# Patient Record
Sex: FEMALE | Race: WHITE | URBAN - NONMETROPOLITAN AREA
[De-identification: names, ages, dates, MRNs, and addresses within clinical notes are randomized per-mention and may not be internally consistent; named-entity substitution may affect disease eponyms.]

---

## 2017-04-07 ENCOUNTER — DOCTOR'S OFFICE (OUTPATIENT)
Dept: URBAN - NONMETROPOLITAN AREA CLINIC 1 | Facility: CLINIC | Age: 37
Setting detail: OPHTHALMOLOGY
End: 2017-04-07
Payer: COMMERCIAL

## 2017-04-07 DIAGNOSIS — H04.121: ICD-10-CM

## 2017-04-07 DIAGNOSIS — H04.122: ICD-10-CM

## 2017-04-07 DIAGNOSIS — H25.13: ICD-10-CM

## 2017-04-07 DIAGNOSIS — H35.033: ICD-10-CM

## 2017-04-07 DIAGNOSIS — G37.9: ICD-10-CM

## 2017-04-07 PROCEDURE — 92014 COMPRE OPH EXAM EST PT 1/>: CPT | Performed by: OPHTHALMOLOGY

## 2017-04-07 PROCEDURE — 92083 EXTENDED VISUAL FIELD XM: CPT | Performed by: OPHTHALMOLOGY

## 2017-04-07 ASSESSMENT — REFRACTION_MANIFEST
OD_VA3: 20/
OS_SPHERE: +0.50
OS_VA1: 20/20-2
OD_SPHERE: +0.50
OS_CYLINDER: -0.75
OS_VA2: 20/20-2
OD_AXIS: 155
OS_VA2: 20/
OS_VA3: 20/
OS_VA3: 20/
OS_VA2: 20/
OD_VA3: 20/
OD_VA1: 20/
OD_VA1: 20/20-2
OU_VA: 20/
OU_VA: 20/
OD_VA1: 20/
OS_VA3: 20/
OU_VA: 20/
OS_VA1: 20/
OD_VA3: 20/
OD_CYLINDER: -0.75
OD_VA2: 20/
OS_VA1: 20/
OD_VA2: 20/20-2
OS_AXIS: 005
OD_VA2: 20/

## 2017-04-07 ASSESSMENT — SUPERFICIAL PUNCTATE KERATITIS (SPK): OS_SPK: T

## 2017-04-07 ASSESSMENT — REFRACTION_CURRENTRX
OD_OVR_VA: 20/
OD_OVR_VA: 20/
OD_AXIS: 168
OS_OVR_VA: 20/
OD_SPHERE: +0.75
OS_OVR_VA: 20/
OS_SPHERE: +0.75
OS_OVR_VA: 20/
OD_OVR_VA: 20/
OS_CYLINDER: -0.50
OD_CYLINDER: -0.75
OS_AXIS: 004

## 2017-04-07 ASSESSMENT — REFRACTION_AUTOREFRACTION
OD_SPHERE: +0.50
OS_SPHERE: +0.50
OS_AXIS: 172
OD_CYLINDER: -0.50
OS_CYLINDER: -0.50
OD_AXIS: 139

## 2017-04-07 ASSESSMENT — SPHEQUIV_DERIVED
OD_SPHEQUIV: 0.125
OS_SPHEQUIV: 0.125
OS_SPHEQUIV: 0.25
OD_SPHEQUIV: 0.25

## 2017-04-07 ASSESSMENT — VISUAL ACUITY
OD_BCVA: 20/20-2
OS_BCVA: 20/20-2

## 2017-04-07 ASSESSMENT — CONFRONTATIONAL VISUAL FIELD TEST (CVF)
OS_FINDINGS: FULL
OD_FINDINGS: FULL

## 2017-04-28 ENCOUNTER — LAB REQUISITION (OUTPATIENT)
Dept: LAB | Facility: HOSPITAL | Age: 37
End: 2017-04-28
Payer: COMMERCIAL

## 2017-04-28 DIAGNOSIS — G35 MULTIPLE SCLEROSIS (HCC): ICD-10-CM

## 2017-04-28 DIAGNOSIS — Z79.52 LONG TERM CURRENT USE OF SYSTEMIC STEROIDS: ICD-10-CM

## 2017-04-28 LAB
ANION GAP SERPL CALCULATED.3IONS-SCNC: 15 MMOL/L (ref 4–13)
BASOPHILS # BLD AUTO: 0.02 THOUSANDS/ΜL (ref 0–0.1)
BASOPHILS NFR BLD AUTO: 0 % (ref 0–1)
BUN SERPL-MCNC: 13 MG/DL (ref 5–25)
CALCIUM SERPL-MCNC: 9.2 MG/DL (ref 8.3–10.1)
CHLORIDE SERPL-SCNC: 105 MMOL/L (ref 100–108)
CO2 SERPL-SCNC: 21 MMOL/L (ref 21–32)
CREAT SERPL-MCNC: 0.74 MG/DL (ref 0.6–1.3)
EOSINOPHIL # BLD AUTO: 0 THOUSAND/ΜL (ref 0–0.61)
EOSINOPHIL NFR BLD AUTO: 0 % (ref 0–6)
ERYTHROCYTE [DISTWIDTH] IN BLOOD BY AUTOMATED COUNT: 14.1 % (ref 11.6–15.1)
GFR SERPL CREATININE-BSD FRML MDRD: >60 ML/MIN/1.73SQ M
GLUCOSE SERPL-MCNC: 142 MG/DL (ref 65–140)
HCT VFR BLD AUTO: 40.7 % (ref 34.8–46.1)
HGB BLD-MCNC: 13.9 G/DL (ref 11.5–15.4)
LYMPHOCYTES # BLD AUTO: 2.05 THOUSANDS/ΜL (ref 0.6–4.47)
LYMPHOCYTES NFR BLD AUTO: 14 % (ref 14–44)
MCH RBC QN AUTO: 30 PG (ref 26.8–34.3)
MCHC RBC AUTO-ENTMCNC: 34.2 G/DL (ref 31.4–37.4)
MCV RBC AUTO: 88 FL (ref 82–98)
MONOCYTES # BLD AUTO: 0.27 THOUSAND/ΜL (ref 0.17–1.22)
MONOCYTES NFR BLD AUTO: 2 % (ref 4–12)
NEUTROPHILS # BLD AUTO: 12.69 THOUSANDS/ΜL (ref 1.85–7.62)
NEUTS SEG NFR BLD AUTO: 84 % (ref 43–75)
PLATELET # BLD AUTO: 182 THOUSANDS/UL (ref 149–390)
PMV BLD AUTO: 12.2 FL (ref 8.9–12.7)
POTASSIUM SERPL-SCNC: 4.3 MMOL/L (ref 3.5–5.3)
RBC # BLD AUTO: 4.63 MILLION/UL (ref 3.81–5.12)
SODIUM SERPL-SCNC: 141 MMOL/L (ref 136–145)
WBC # BLD AUTO: 15.03 THOUSAND/UL (ref 4.31–10.16)

## 2017-04-28 PROCEDURE — 80048 BASIC METABOLIC PNL TOTAL CA: CPT | Performed by: INTERNAL MEDICINE

## 2017-04-28 PROCEDURE — 85025 COMPLETE CBC W/AUTO DIFF WBC: CPT | Performed by: INTERNAL MEDICINE

## 2017-05-15 ENCOUNTER — DOCTOR'S OFFICE (OUTPATIENT)
Dept: URBAN - NONMETROPOLITAN AREA CLINIC 1 | Facility: CLINIC | Age: 37
Setting detail: OPHTHALMOLOGY
End: 2017-05-15
Payer: COMMERCIAL

## 2017-05-15 DIAGNOSIS — H04.122: ICD-10-CM

## 2017-05-15 DIAGNOSIS — H35.033: ICD-10-CM

## 2017-05-15 DIAGNOSIS — H53.122: ICD-10-CM

## 2017-05-15 DIAGNOSIS — G37.9: ICD-10-CM

## 2017-05-15 DIAGNOSIS — H25.13: ICD-10-CM

## 2017-05-15 DIAGNOSIS — H04.121: ICD-10-CM

## 2017-05-15 PROCEDURE — 92014 COMPRE OPH EXAM EST PT 1/>: CPT | Performed by: OPHTHALMOLOGY

## 2017-05-15 PROCEDURE — 92134 CPTRZ OPH DX IMG PST SGM RTA: CPT | Performed by: OPHTHALMOLOGY

## 2017-05-15 ASSESSMENT — REFRACTION_MANIFEST
OS_VA1: 20/20-2
OS_AXIS: 005
OS_VA2: 20/20-2
OD_VA3: 20/
OS_VA3: 20/
OS_CYLINDER: -0.75
OD_VA1: 20/20-2
OS_VA3: 20/
OD_SPHERE: +0.50
OS_VA3: 20/
OD_VA1: 20/
OU_VA: 20/
OS_VA2: 20/
OD_CYLINDER: -0.75
OS_VA2: 20/
OD_VA1: 20/
OS_SPHERE: +0.50
OS_VA1: 20/
OD_VA2: 20/20-2
OD_VA3: 20/
OD_VA2: 20/
OD_VA2: 20/
OU_VA: 20/
OS_VA1: 20/
OD_VA3: 20/
OD_AXIS: 155
OU_VA: 20/

## 2017-05-15 ASSESSMENT — SUPERFICIAL PUNCTATE KERATITIS (SPK): OS_SPK: T

## 2017-05-15 ASSESSMENT — REFRACTION_CURRENTRX
OD_OVR_VA: 20/
OD_SPHERE: +0.75
OD_AXIS: 168
OS_OVR_VA: 20/
OS_AXIS: 004
OS_SPHERE: +0.75
OS_CYLINDER: -0.50
OS_OVR_VA: 20/
OD_CYLINDER: -0.75
OD_OVR_VA: 20/
OS_OVR_VA: 20/
OD_OVR_VA: 20/

## 2017-05-15 ASSESSMENT — REFRACTION_AUTOREFRACTION
OS_AXIS: 172
OS_SPHERE: +0.50
OD_SPHERE: +0.50
OS_CYLINDER: -0.50
OD_AXIS: 139
OD_CYLINDER: -0.50

## 2017-05-15 ASSESSMENT — VISUAL ACUITY
OD_BCVA: 20/20
OS_BCVA: 20/20-2

## 2017-05-15 ASSESSMENT — SPHEQUIV_DERIVED
OS_SPHEQUIV: 0.125
OS_SPHEQUIV: 0.25
OD_SPHEQUIV: 0.125
OD_SPHEQUIV: 0.25

## 2017-05-15 ASSESSMENT — CONFRONTATIONAL VISUAL FIELD TEST (CVF)
OS_FINDINGS: FULL
OD_FINDINGS: FULL

## 2018-01-10 ENCOUNTER — DOCTOR'S OFFICE (OUTPATIENT)
Dept: URBAN - NONMETROPOLITAN AREA CLINIC 1 | Facility: CLINIC | Age: 38
Setting detail: OPHTHALMOLOGY
End: 2018-01-10
Payer: COMMERCIAL

## 2018-01-10 DIAGNOSIS — G37.9: ICD-10-CM

## 2018-01-10 DIAGNOSIS — H04.122: ICD-10-CM

## 2018-01-10 DIAGNOSIS — H04.121: ICD-10-CM

## 2018-01-10 DIAGNOSIS — H53.122: ICD-10-CM

## 2018-01-10 DIAGNOSIS — H46.8: ICD-10-CM

## 2018-01-10 DIAGNOSIS — H35.033: ICD-10-CM

## 2018-01-10 PROCEDURE — 92083 EXTENDED VISUAL FIELD XM: CPT | Performed by: OPHTHALMOLOGY

## 2018-01-10 PROCEDURE — 99214 OFFICE O/P EST MOD 30 MIN: CPT | Performed by: OPHTHALMOLOGY

## 2018-01-10 ASSESSMENT — CONFRONTATIONAL VISUAL FIELD TEST (CVF)
OS_FINDINGS: FULL
OD_FINDINGS: FULL

## 2018-01-10 ASSESSMENT — SUPERFICIAL PUNCTATE KERATITIS (SPK): OS_SPK: T

## 2018-01-11 ASSESSMENT — VISUAL ACUITY
OS_BCVA: 20/25-1
OD_BCVA: 20/30+1

## 2018-01-11 ASSESSMENT — REFRACTION_MANIFEST
OS_CYLINDER: -0.75
OS_VA3: 20/
OS_VA1: 20/20-2
OD_VA3: 20/
OD_VA2: 20/20-2
OU_VA: 20/
OD_VA2: 20/
OU_VA: 20/
OD_AXIS: 155
OD_VA1: 20/20-2
OD_VA2: 20/
OD_VA3: 20/
OU_VA: 20/
OD_VA1: 20/
OS_VA1: 20/
OD_SPHERE: +0.50
OS_VA3: 20/
OS_VA2: 20/
OD_CYLINDER: -0.75
OD_VA3: 20/
OS_VA2: 20/
OS_VA1: 20/
OD_VA1: 20/
OS_VA2: 20/20-2
OS_SPHERE: +0.50
OS_VA3: 20/
OS_AXIS: 005

## 2018-01-11 ASSESSMENT — REFRACTION_AUTOREFRACTION
OD_AXIS: 139
OD_CYLINDER: -0.50
OS_SPHERE: +0.50
OS_AXIS: 172
OD_SPHERE: +0.50
OS_CYLINDER: -0.50

## 2018-01-11 ASSESSMENT — REFRACTION_CURRENTRX
OD_SPHERE: +0.75
OD_OVR_VA: 20/
OD_OVR_VA: 20/
OD_AXIS: 168
OD_CYLINDER: -0.75
OD_OVR_VA: 20/
OS_OVR_VA: 20/
OS_OVR_VA: 20/
OS_CYLINDER: -0.50
OS_OVR_VA: 20/
OS_AXIS: 004
OS_SPHERE: +0.75

## 2018-01-11 ASSESSMENT — SPHEQUIV_DERIVED
OS_SPHEQUIV: 0.125
OD_SPHEQUIV: 0.25
OS_SPHEQUIV: 0.25
OD_SPHEQUIV: 0.125

## 2018-01-18 ENCOUNTER — DOCTOR'S OFFICE (OUTPATIENT)
Dept: URBAN - NONMETROPOLITAN AREA CLINIC 1 | Facility: CLINIC | Age: 38
Setting detail: OPHTHALMOLOGY
End: 2018-01-18
Payer: COMMERCIAL

## 2018-01-18 DIAGNOSIS — H46.8: ICD-10-CM

## 2018-01-18 DIAGNOSIS — G37.9: ICD-10-CM

## 2018-01-18 PROCEDURE — 99213 OFFICE O/P EST LOW 20 MIN: CPT | Performed by: OPHTHALMOLOGY

## 2018-01-18 PROCEDURE — 92083 EXTENDED VISUAL FIELD XM: CPT | Performed by: OPHTHALMOLOGY

## 2018-01-18 ASSESSMENT — REFRACTION_MANIFEST
OD_AXIS: 155
OD_VA2: 20/20-2
OS_VA1: 20/
OU_VA: 20/
OD_VA2: 20/
OS_VA3: 20/
OS_VA1: 20/20-2
OD_VA1: 20/
OS_VA2: 20/
OD_VA1: 20/20-2
OS_AXIS: 005
OD_VA3: 20/
OS_VA3: 20/
OS_SPHERE: +0.50
OS_VA3: 20/
OS_CYLINDER: -0.75
OD_VA3: 20/
OD_VA3: 20/
OS_VA2: 20/
OD_CYLINDER: -0.75
OS_VA1: 20/
OD_VA1: 20/
OD_SPHERE: +0.50
OD_VA2: 20/
OU_VA: 20/
OU_VA: 20/
OS_VA2: 20/20-2

## 2018-01-18 ASSESSMENT — REFRACTION_CURRENTRX
OS_AXIS: 004
OD_SPHERE: +0.75
OD_CYLINDER: -0.75
OD_OVR_VA: 20/
OS_OVR_VA: 20/
OS_CYLINDER: -0.50
OD_OVR_VA: 20/
OD_OVR_VA: 20/
OS_SPHERE: +0.75
OS_OVR_VA: 20/
OS_OVR_VA: 20/
OD_AXIS: 168

## 2018-01-18 ASSESSMENT — REFRACTION_AUTOREFRACTION
OD_SPHERE: +0.50
OS_AXIS: 172
OS_SPHERE: +0.50
OD_AXIS: 139
OD_CYLINDER: -0.50
OS_CYLINDER: -0.50

## 2018-01-18 ASSESSMENT — SPHEQUIV_DERIVED
OS_SPHEQUIV: 0.125
OD_SPHEQUIV: 0.25
OD_SPHEQUIV: 0.125
OS_SPHEQUIV: 0.25

## 2018-01-18 ASSESSMENT — VISUAL ACUITY
OS_BCVA: 20/25+2
OD_BCVA: 20/25-2

## 2018-01-18 ASSESSMENT — SUPERFICIAL PUNCTATE KERATITIS (SPK): OS_SPK: T

## 2018-08-28 ENCOUNTER — DOCTOR'S OFFICE (OUTPATIENT)
Dept: URBAN - METROPOLITAN AREA CLINIC 125 | Facility: CLINIC | Age: 38
Setting detail: OPHTHALMOLOGY
End: 2018-08-28

## 2018-08-28 DIAGNOSIS — H25.013: ICD-10-CM

## 2018-08-28 PROCEDURE — DISABILITY BUREAU OF DISABILITY REPORT: Performed by: OPHTHALMOLOGY

## 2018-10-11 ENCOUNTER — DOCTOR'S OFFICE (OUTPATIENT)
Dept: URBAN - NONMETROPOLITAN AREA CLINIC 1 | Facility: CLINIC | Age: 38
Setting detail: OPHTHALMOLOGY
End: 2018-10-11
Payer: COMMERCIAL

## 2018-10-11 DIAGNOSIS — H46.8: ICD-10-CM

## 2018-10-11 DIAGNOSIS — H25.13: ICD-10-CM

## 2018-10-11 DIAGNOSIS — H35.033: ICD-10-CM

## 2018-10-11 PROCEDURE — 99214 OFFICE O/P EST MOD 30 MIN: CPT | Performed by: OPHTHALMOLOGY

## 2018-10-11 ASSESSMENT — REFRACTION_MANIFEST
OD_AXIS: 155
OD_VA3: 20/
OS_CYLINDER: -0.75
OS_VA2: 20/20-2
OS_VA3: 20/
OD_VA3: 20/
OU_VA: 20/
OD_CYLINDER: -0.75
OD_VA2: 20/20-2
OU_VA: 20/
OD_VA2: 20/
OS_VA1: 20/20-2
OS_VA3: 20/
OD_VA1: 20/20-2
OS_VA2: 20/
OS_SPHERE: +0.50
OD_SPHERE: +0.50
OS_AXIS: 005
OD_VA1: 20/
OS_VA1: 20/

## 2018-10-11 ASSESSMENT — SPHEQUIV_DERIVED
OS_SPHEQUIV: 0.25
OS_SPHEQUIV: 0.125
OD_SPHEQUIV: 0.125
OD_SPHEQUIV: 0.25

## 2018-10-11 ASSESSMENT — VISUAL ACUITY
OD_BCVA: 20/25-2
OS_BCVA: 20/30+2

## 2018-10-11 ASSESSMENT — REFRACTION_CURRENTRX
OS_OVR_VA: 20/
OD_OVR_VA: 20/
OD_OVR_VA: 20/
OD_AXIS: 168
OS_CYLINDER: -0.50
OS_OVR_VA: 20/
OS_AXIS: 004
OD_CYLINDER: -0.75
OD_OVR_VA: 20/
OS_SPHERE: +0.75
OS_OVR_VA: 20/
OD_SPHERE: +0.75

## 2018-10-11 ASSESSMENT — REFRACTION_AUTOREFRACTION
OS_AXIS: 172
OS_SPHERE: +0.50
OD_AXIS: 139
OD_SPHERE: +0.50
OS_CYLINDER: -0.50
OD_CYLINDER: -0.50

## 2018-10-11 ASSESSMENT — SUPERFICIAL PUNCTATE KERATITIS (SPK): OS_SPK: T

## 2019-02-13 LAB
C TRACH RRNA SPEC QL NAA+PROBE: NOT DETECTED
CLINICAL INFO: NORMAL
CYTO CVX: NORMAL
CYTOLOGY CMNT CVX/VAG CYTO-IMP: NORMAL
DATE PREVIOUS BX: NORMAL
LMP START DATE: 2008
N GONORRHOEA RRNA SPEC QL NAA+PROBE: NOT DETECTED
SL AMB PREV. PAP:: NORMAL
SPECIMEN SOURCE CVX/VAG CYTO: NORMAL
STAT OF ADQ CVX/VAG CYTO-IMP: NORMAL

## 2019-12-21 ENCOUNTER — APPOINTMENT (EMERGENCY)
Dept: ULTRASOUND IMAGING | Facility: HOSPITAL | Age: 39
End: 2019-12-21
Payer: COMMERCIAL

## 2019-12-21 ENCOUNTER — APPOINTMENT (EMERGENCY)
Dept: CT IMAGING | Facility: HOSPITAL | Age: 39
End: 2019-12-21
Payer: COMMERCIAL

## 2019-12-21 ENCOUNTER — HOSPITAL ENCOUNTER (EMERGENCY)
Facility: HOSPITAL | Age: 39
Discharge: HOME | End: 2019-12-21
Admitting: EMERGENCY MEDICINE
Payer: COMMERCIAL

## 2019-12-21 VITALS
TEMPERATURE: 97.8 F | OXYGEN SATURATION: 100 % | WEIGHT: 150 LBS | SYSTOLIC BLOOD PRESSURE: 122 MMHG | RESPIRATION RATE: 18 BRPM | BODY MASS INDEX: 23.54 KG/M2 | HEIGHT: 67 IN | DIASTOLIC BLOOD PRESSURE: 75 MMHG | HEART RATE: 71 BPM

## 2019-12-21 DIAGNOSIS — N30.00 ACUTE CYSTITIS WITHOUT HEMATURIA: Primary | ICD-10-CM

## 2019-12-21 DIAGNOSIS — K52.9 GASTROENTERITIS: ICD-10-CM

## 2019-12-21 LAB
ALBUMIN SERPL BCP-MCNC: 4 G/DL (ref 3.5–5)
ALP SERPL-CCNC: 61 U/L (ref 46–116)
ALT SERPL W P-5'-P-CCNC: 12 U/L (ref 12–78)
ANION GAP SERPL CALCULATED.3IONS-SCNC: 6 MMOL/L (ref 4–13)
AST SERPL W P-5'-P-CCNC: 7 U/L (ref 5–45)
BACTERIA UR QL AUTO: ABNORMAL /HPF
BASOPHILS # BLD AUTO: 0.04 THOUSANDS/ΜL (ref 0–0.1)
BASOPHILS NFR BLD AUTO: 0 % (ref 0–1)
BILIRUB SERPL-MCNC: 0.45 MG/DL (ref 0.2–1)
BILIRUB UR QL STRIP: ABNORMAL
BUN SERPL-MCNC: 13 MG/DL (ref 5–25)
CALCIUM SERPL-MCNC: 9.4 MG/DL (ref 8.3–10.1)
CHLORIDE SERPL-SCNC: 100 MMOL/L (ref 100–108)
CLARITY UR: ABNORMAL
CO2 SERPL-SCNC: 31 MMOL/L (ref 21–32)
COLOR UR: ABNORMAL
CREAT SERPL-MCNC: 0.82 MG/DL (ref 0.6–1.3)
EOSINOPHIL # BLD AUTO: 0.01 THOUSAND/ΜL (ref 0–0.61)
EOSINOPHIL NFR BLD AUTO: 0 % (ref 0–6)
ERYTHROCYTE [DISTWIDTH] IN BLOOD BY AUTOMATED COUNT: 13.2 % (ref 11.6–15.1)
GFR SERPL CREATININE-BSD FRML MDRD: 92 ML/MIN/1.73SQ M
GLUCOSE SERPL-MCNC: 110 MG/DL (ref 65–140)
GLUCOSE UR STRIP-MCNC: NEGATIVE MG/DL
HCT VFR BLD AUTO: 45.6 % (ref 34.8–46.1)
HGB BLD-MCNC: 15.1 G/DL (ref 11.5–15.4)
HGB UR QL STRIP.AUTO: NEGATIVE
HYALINE CASTS #/AREA URNS LPF: ABNORMAL /LPF
IMM GRANULOCYTES # BLD AUTO: 0.05 THOUSAND/UL (ref 0–0.2)
IMM GRANULOCYTES NFR BLD AUTO: 0 % (ref 0–2)
KETONES UR STRIP-MCNC: ABNORMAL MG/DL
LACTATE SERPL-SCNC: 0.6 MMOL/L (ref 0.5–2)
LEUKOCYTE ESTERASE UR QL STRIP: ABNORMAL
LIPASE SERPL-CCNC: 507 U/L (ref 73–393)
LYMPHOCYTES # BLD AUTO: 1.42 THOUSANDS/ΜL (ref 0.6–4.47)
LYMPHOCYTES NFR BLD AUTO: 11 % (ref 14–44)
MCH RBC QN AUTO: 29.8 PG (ref 26.8–34.3)
MCHC RBC AUTO-ENTMCNC: 33.1 G/DL (ref 31.4–37.4)
MCV RBC AUTO: 90 FL (ref 82–98)
MONOCYTES # BLD AUTO: 1.77 THOUSAND/ΜL (ref 0.17–1.22)
MONOCYTES NFR BLD AUTO: 14 % (ref 4–12)
MUCOUS THREADS UR QL AUTO: ABNORMAL
NEUTROPHILS # BLD AUTO: 9.26 THOUSANDS/ΜL (ref 1.85–7.62)
NEUTS SEG NFR BLD AUTO: 75 % (ref 43–75)
NITRITE UR QL STRIP: POSITIVE
NON-SQ EPI CELLS URNS QL MICRO: ABNORMAL /HPF
NRBC BLD AUTO-RTO: 0 /100 WBCS
PH UR STRIP.AUTO: 5 [PH]
PLATELET # BLD AUTO: 165 THOUSANDS/UL (ref 149–390)
PMV BLD AUTO: 11.7 FL (ref 8.9–12.7)
POTASSIUM SERPL-SCNC: 4.1 MMOL/L (ref 3.5–5.3)
PROT SERPL-MCNC: 7.5 G/DL (ref 6.4–8.2)
PROT UR STRIP-MCNC: ABNORMAL MG/DL
RBC # BLD AUTO: 5.07 MILLION/UL (ref 3.81–5.12)
RBC #/AREA URNS AUTO: ABNORMAL /HPF
SODIUM SERPL-SCNC: 137 MMOL/L (ref 136–145)
SP GR UR STRIP.AUTO: 1.02 (ref 1–1.03)
UROBILINOGEN UR QL STRIP.AUTO: 0.2 E.U./DL
WBC # BLD AUTO: 12.55 THOUSAND/UL (ref 4.31–10.16)
WBC #/AREA URNS AUTO: ABNORMAL /HPF

## 2019-12-21 PROCEDURE — 76830 TRANSVAGINAL US NON-OB: CPT

## 2019-12-21 PROCEDURE — 85025 COMPLETE CBC W/AUTO DIFF WBC: CPT | Performed by: PHYSICIAN ASSISTANT

## 2019-12-21 PROCEDURE — 74177 CT ABD & PELVIS W/CONTRAST: CPT

## 2019-12-21 PROCEDURE — 83690 ASSAY OF LIPASE: CPT | Performed by: PHYSICIAN ASSISTANT

## 2019-12-21 PROCEDURE — 99284 EMERGENCY DEPT VISIT MOD MDM: CPT | Performed by: PHYSICIAN ASSISTANT

## 2019-12-21 PROCEDURE — 96361 HYDRATE IV INFUSION ADD-ON: CPT

## 2019-12-21 PROCEDURE — 81001 URINALYSIS AUTO W/SCOPE: CPT | Performed by: PHYSICIAN ASSISTANT

## 2019-12-21 PROCEDURE — 96375 TX/PRO/DX INJ NEW DRUG ADDON: CPT

## 2019-12-21 PROCEDURE — 99284 EMERGENCY DEPT VISIT MOD MDM: CPT

## 2019-12-21 PROCEDURE — 96374 THER/PROPH/DIAG INJ IV PUSH: CPT

## 2019-12-21 PROCEDURE — 80053 COMPREHEN METABOLIC PANEL: CPT | Performed by: PHYSICIAN ASSISTANT

## 2019-12-21 PROCEDURE — 76770 US EXAM ABDO BACK WALL COMP: CPT

## 2019-12-21 PROCEDURE — 83605 ASSAY OF LACTIC ACID: CPT | Performed by: PHYSICIAN ASSISTANT

## 2019-12-21 PROCEDURE — 76856 US EXAM PELVIC COMPLETE: CPT

## 2019-12-21 PROCEDURE — 36415 COLL VENOUS BLD VENIPUNCTURE: CPT | Performed by: PHYSICIAN ASSISTANT

## 2019-12-21 RX ORDER — GABAPENTIN 300 MG/1
CAPSULE ORAL
COMMUNITY
Start: 2019-07-15

## 2019-12-21 RX ORDER — NITROFURANTOIN 25; 75 MG/1; MG/1
100 CAPSULE ORAL 2 TIMES DAILY
Qty: 10 CAPSULE | Refills: 0 | Status: SHIPPED | OUTPATIENT
Start: 2019-12-21

## 2019-12-21 RX ORDER — CARBAMAZEPINE 100 MG/1
TABLET, EXTENDED RELEASE ORAL
COMMUNITY
Start: 2017-06-21

## 2019-12-21 RX ORDER — MODAFINIL 100 MG/1
100 TABLET ORAL DAILY
COMMUNITY
Start: 2019-11-11

## 2019-12-21 RX ORDER — FLUTICASONE PROPIONATE 50 MCG
SPRAY, SUSPENSION (ML) NASAL
COMMUNITY
Start: 2018-08-13

## 2019-12-21 RX ORDER — ONDANSETRON 2 MG/ML
4 INJECTION INTRAMUSCULAR; INTRAVENOUS ONCE
Status: COMPLETED | OUTPATIENT
Start: 2019-12-21 | End: 2019-12-21

## 2019-12-21 RX ORDER — TOPIRAMATE 50 MG/1
50 TABLET, FILM COATED ORAL
COMMUNITY
Start: 2015-07-15

## 2019-12-21 RX ORDER — DESOGESTREL AND ETHINYL ESTRADIOL 21-5 (28)
1 KIT ORAL
COMMUNITY
Start: 2018-02-25

## 2019-12-21 RX ORDER — FENTANYL CITRATE 50 UG/ML
50 INJECTION, SOLUTION INTRAMUSCULAR; INTRAVENOUS ONCE
Status: COMPLETED | OUTPATIENT
Start: 2019-12-21 | End: 2019-12-21

## 2019-12-21 RX ORDER — ONDANSETRON 4 MG/1
4 TABLET, FILM COATED ORAL EVERY 6 HOURS
Qty: 12 TABLET | Refills: 0 | Status: SHIPPED | OUTPATIENT
Start: 2019-12-21

## 2019-12-21 RX ORDER — FEXOFENADINE HCL AND PSEUDOEPHEDRINE HCI 60; 120 MG/1; MG/1
1 TABLET, EXTENDED RELEASE ORAL
COMMUNITY
Start: 2019-10-15

## 2019-12-21 RX ORDER — NITROFURANTOIN 25; 75 MG/1; MG/1
100 CAPSULE ORAL ONCE
Status: COMPLETED | OUTPATIENT
Start: 2019-12-21 | End: 2019-12-21

## 2019-12-21 RX ORDER — KETOROLAC TROMETHAMINE 30 MG/ML
30 INJECTION, SOLUTION INTRAMUSCULAR; INTRAVENOUS ONCE
Status: COMPLETED | OUTPATIENT
Start: 2019-12-21 | End: 2019-12-21

## 2019-12-21 RX ADMIN — ONDANSETRON 4 MG: 2 INJECTION INTRAMUSCULAR; INTRAVENOUS at 14:18

## 2019-12-21 RX ADMIN — IOHEXOL 100 ML: 350 INJECTION, SOLUTION INTRAVENOUS at 16:51

## 2019-12-21 RX ADMIN — NITROFURANTOIN (MONOHYDRATE/MACROCRYSTALS) 100 MG: 75; 25 CAPSULE ORAL at 17:43

## 2019-12-21 RX ADMIN — SODIUM CHLORIDE 1000 ML: 0.9 INJECTION, SOLUTION INTRAVENOUS at 14:03

## 2019-12-21 RX ADMIN — FENTANYL CITRATE 50 MCG: 50 INJECTION, SOLUTION INTRAMUSCULAR; INTRAVENOUS at 16:24

## 2019-12-21 RX ADMIN — KETOROLAC TROMETHAMINE 30 MG: 30 INJECTION, SOLUTION INTRAMUSCULAR; INTRAVENOUS at 14:15

## 2019-12-23 NOTE — ED PROVIDER NOTES
History  Chief Complaint   Patient presents with    Abdominal Pain     pt is with abd pain for the past two day to the left side     The patient is a 82-year-old female who presents emergency department today with a chief complaint of left lower quadrant abdominal pain  Patient states that she has had gradual worsening left lower quadrant abdominal pain over the last 2 days  Patient has associated nausea or however declines vomiting, diarrhea, fevers or chills, shortness breath  Patient states that she is status post hysterectomy secondary to endometriosis  Patient denies any vaginal bleeding, dysuria, hematuria patient denies any radiation of her pain in her left lower quadrant  Patient states that is a constant cramping it at 10 sensation located left lower quadrant  Abdominal Pain   Pain location:  LLQ  Pain quality: cramping    Pain radiates to:  Does not radiate  Pain severity:  Moderate  Onset quality:  Gradual  Duration:  2 days  Timing:  Constant  Progression:  Worsening  Chronicity:  New  Context: recent illness    Context: not eating and not sick contacts    Worsened by:  Nothing  Ineffective treatments:  Acetaminophen  Associated symptoms: nausea    Associated symptoms: no anorexia, no belching, no chest pain, no chills, no fatigue, no fever, no flatus, no hematemesis, no hematochezia, no hematuria, no shortness of breath, no sore throat and no vaginal bleeding    Nausea:     Severity:  Mild    Onset quality:  Gradual    Timing:  Unable to specify    Progression:  Unchanged  Risk factors: no alcohol abuse, no aspirin use, has not had multiple surgeries, no NSAID use, not pregnant and no recent hospitalization        Prior to Admission Medications   Prescriptions Last Dose Informant Patient Reported? Taking?    Ocrelizumab (OCREVUS IV)   Yes Yes   Sig: Infuse into a venous catheter   carBAMazepine (TEGRETOL-XR) 100 mg 12 hr tablet   Yes Yes   Sig: TAKE 2 TABLETS IN THE MORNING, 1 TABLET MID-DAY, AND 2 TABLETS AT BEDTIME   desogestrel-ethinyl estradiol (KARIVA) 0 15-0 02/0 01 MG (21/5) per tablet   Yes Yes   Sig: Take 1 tablet by mouth   fexofenadine-pseudoephedrine (ALLEGRA-D)  MG per tablet   Yes Yes   Sig: Take 1 tablet by mouth   fluticasone (FLONASE) 50 mcg/act nasal spray   Yes Yes   Sig: ADMINISTER 2 SPRAYS INTO EACH NOSTRIL DAILY  gabapentin (NEURONTIN) 300 mg capsule   Yes Yes   Sig: TAKE ONE CAPSULE BY MOUTH EVERY EVENING   modafinil (PROVIGIL) 100 mg tablet   Yes Yes   Sig: Take 100 mg by mouth daily   topiramate (TOPAMAX) 50 MG tablet   Yes Yes   Sig: Take 50 mg by mouth      Facility-Administered Medications: None       Past Medical History:   Diagnosis Date    MS (multiple sclerosis) ([facility] [address] )        Past Surgical History:   Procedure Laterality Date    APPENDECTOMY      HYSTERECTOMY      OTHER SURGICAL HISTORY      kidney stones       History reviewed  No pertinent family history  I have reviewed and agree with the history as documented  Social History     Tobacco Use    Smoking status: Current Every Day Smoker    Smokeless tobacco: Never Used   Substance Use Topics    Alcohol use: Not Currently    Drug use: Not Currently        Review of Systems   Constitutional: Negative for chills, fatigue and fever  HENT: Negative for sore throat  Respiratory: Negative for shortness of breath  Cardiovascular: Negative for chest pain  Gastrointestinal: Positive for abdominal pain and nausea  Negative for anorexia, flatus, hematemesis and hematochezia  Genitourinary: Negative for hematuria and vaginal bleeding  Physical Exam  Physical Exam   Constitutional: She is oriented to person, place, and time  She appears well-developed and well-nourished  No distress  HENT:   Head: Normocephalic and atraumatic  Right Ear: External ear normal    Left Ear: External ear normal    Mouth/Throat: Oropharynx is clear and moist    Eyes: Pupils are equal, round, and reactive to light   EOM are normal    Neck: Normal range of motion  Neck supple  Cardiovascular: Normal rate, regular rhythm and intact distal pulses  No murmur heard  Pulmonary/Chest: Effort normal and breath sounds normal  No respiratory distress  She has no wheezes  Abdominal: Soft  Bowel sounds are normal  She exhibits no mass  There is tenderness in the epigastric area and left lower quadrant  There is no rebound  No hernia  Neurological: She is alert and oriented to person, place, and time  Coordination normal    Skin: Skin is warm and dry  Capillary refill takes less than 2 seconds  Psychiatric: She has a normal mood and affect  Her behavior is normal    Nursing note and vitals reviewed        Vital Signs  ED Triage Vitals   Temperature Pulse Respirations Blood Pressure SpO2   12/21/19 1547 12/21/19 1259 12/21/19 1259 12/21/19 1259 12/21/19 1259   97 8 °F (36 6 °C) 75 18 133/80 99 %      Temp Source Heart Rate Source Patient Position - Orthostatic VS BP Location FiO2 (%)   12/21/19 1547 12/21/19 1259 12/21/19 1547 12/21/19 1259 --   Oral Monitor Lying Left arm       Pain Score       12/21/19 1259       5           Vitals:    12/21/19 1259 12/21/19 1547 12/21/19 1740   BP: 133/80 98/72 122/75   Pulse: 75 82 71   Patient Position - Orthostatic VS:  Lying          Visual Acuity      ED Medications  Medications   sodium chloride 0 9 % bolus 1,000 mL (0 mL Intravenous Stopped 12/21/19 1612)   ondansetron (ZOFRAN) injection 4 mg (4 mg Intravenous Given 12/21/19 1418)   ketorolac (TORADOL) injection 30 mg (30 mg Intravenous Given 12/21/19 1415)   fentanyl citrate (PF) 100 MCG/2ML 50 mcg (50 mcg Intravenous Given 12/21/19 1624)   iohexol (OMNIPAQUE) 350 MG/ML injection (MULTI-DOSE) 100 mL (100 mL Intravenous Given 12/21/19 1651)   nitrofurantoin (MACROBID) extended-release capsule 100 mg (100 mg Oral Given 12/21/19 1743)       Diagnostic Studies  Results Reviewed     Procedure Component Value Units Date/Time    Urine Microscopic [[id number]]  (Abnormal) Collected:  12/21/19 1547    Lab Status:  Final result Specimen:  Urine, Clean Catch Updated:  12/21/19 1619     RBC, UA None Seen /hpf      WBC, UA 10-20 /hpf      Epithelial Cells Moderate /hpf      Bacteria, UA Innumerable /hpf      Hyaline Casts, UA 2-4 /lpf      MUCUS THREADS Moderate    UA (URINE) with reflex to Scope [[id number]]  (Abnormal) Collected:  12/21/19 1547    Lab Status:  Final result Specimen:  Urine, Clean Catch Updated:  12/21/19 1555     Color, UA Lily     Clarity, UA Slightly Cloudy     Specific [place], UA 1 020     pH, UA 5 0     Leukocytes, UA Trace     Nitrite, UA Positive     Protein, UA Trace mg/dl      Glucose, UA Negative mg/dl      Ketones, UA Trace mg/dl      Urobilinogen, UA 0 2 E U /dl      Bilirubin, UA Small     Blood, UA Negative    Lactic acid, plasma [[id number]]  (Normal) Collected:  12/21/19 1407    Lab Status:  Final result Specimen:  Blood from Arm, Left Updated:  12/21/19 1435     LACTIC ACID 0 6 mmol/L     Narrative:       Result may be elevated if tourniquet was used during collection      Comprehensive metabolic panel [[id number]] Collected:  12/21/19 1407    Lab Status:  Final result Specimen:  Blood from Arm, Left Updated:  12/21/19 1432     Sodium 137 mmol/L      Potassium 4 1 mmol/L      Chloride 100 mmol/L      CO2 31 mmol/L      ANION GAP 6 mmol/L      BUN 13 mg/dL      Creatinine 0 82 mg/dL      Glucose 110 mg/dL      Calcium 9 4 mg/dL      AST 7 U/L      ALT 12 U/L      Alkaline Phosphatase 61 U/L      Total Protein 7 5 g/dL      Albumin 4 0 g/dL      Total Bilirubin 0 45 mg/dL      eGFR 92 ml/min/1 73sq m     Narrative:       [place] guidelines for Chronic Kidney Disease (CKD):     Stage 1 with normal or high GFR (GFR > 90 mL/min/1 73 square meters)    Stage 2 Mild CKD (GFR = 60-89 mL/min/1 73 square meters)    Stage 3A Moderate CKD (GFR = 45-59 mL/min/1 73 square meters)    Stage 3B Moderate CKD (GFR = 30-44 mL/min/1 73 square meters)    Stage 4 Severe CKD (GFR = 15-29 mL/min/1 73 square meters)    Stage 5 End Stage CKD (GFR <15 mL/min/1 73 square meters)  Note: GFR calculation is accurate only with a steady state creatinine    Lipase [[id number]]  (Abnormal) Collected:  12/21/19 1407    Lab Status:  Final result Specimen:  Blood from Arm, Left Updated:  12/21/19 1432     Lipase 507 u/L     CBC and differential [[id number]]  (Abnormal) Collected:  12/21/19 1407    Lab Status:  Final result Specimen:  Blood from Arm, Left Updated:  12/21/19 1415     WBC 12 55 Thousand/uL      RBC 5 07 Million/uL      Hemoglobin 15 1 g/dL      Hematocrit 45 6 %      MCV 90 fL      MCH 29 8 pg      MCHC 33 1 g/dL      RDW 13 2 %      MPV 11 7 fL      Platelets 454 Thousands/uL      nRBC 0 /100 WBCs      Neutrophils Relative 75 %      Immat GRANS % 0 %      Lymphocytes Relative 11 %      Monocytes Relative 14 %      Eosinophils Relative 0 %      Basophils Relative 0 %      Neutrophils Absolute 9 26 Thousands/µL      Immature Grans Absolute 0 05 Thousand/uL      Lymphocytes Absolute 1 42 Thousands/µL      Monocytes Absolute 1 77 Thousand/µL      Eosinophils Absolute 0 01 Thousand/µL      Basophils Absolute 0 04 Thousands/µL                  CT abdomen pelvis with contrast   Final Result by Joshua Yu MD (12/21 6781)   Apparent gastric antral wall thickening suspicious for antral gastritis      Cystic and solid lesion with calcification involving the upper pole of the left kidney suspicious for angiomyolipoma      Tiny nonobstructive bilateral renal calculi      Status post hysterectomy      Workstation performed: [id number]         US pelvis complete w transvaginal   Final Result by Deanna Carney MD (12/21 1148)          1  Status post hysterectomy  2   Normal-appearing ovaries with small bilateral follicles                        Workstation performed: [id number]         US kidney and bladder   Final Result by Deanna Carney, MD (12/21 8681)         1  Focal 1 4 cm area in the upper pole of left kidney with calcification and/or adjacent to it as described above  It is uncertain whether this represents a small calculus resulting in a calyceal obstruction or a parenchymal calcification within or    adjacent to a cystic area  2   No evidence for obstructive uropathy on either side  Workstation performed: [id number]                    Procedures  Procedures         ED Course                               MDM  Number of Diagnoses or Management Options  Acute cystitis without hematuria:   Gastroenteritis:   Diagnosis management comments: Differential diagnosis included was not limited to pancreatitis, gastritis, cholecystitis, kidney stones, UTI, ovarian cyst  Patient is a 45-year-old female who presented with a chief complaint of left lower quadrant pain  Upon examination patient had noted left lower quadrant pain but also had epigastric pain and nausea  Laboratory in findings illustrated an elevated lipase  Ultrasound of pelvis and renal ultrasound negative for any acute findings  Discussion with patient upon laboratory findings and possible CT  Patient felt more comfortable with CT scan despite radiation  IV contrasted CT scan of abdomen pelvis obtained and did not demonstrate any signs of pancreatitis however did indicate possible gastritis  UA indicative for acute cystitis and due to left lower quadrant pain patient was treated  Patient educated on findings and given medications for outpatient symptom control  Patient felt comfortable with discharge and current treatment plan  Of note patient had incidental finding of left renal pole cyst   I instructed the patient to have follow-up with primary care regarding this finding  Patient expressed understanding         Amount and/or Complexity of Data Reviewed  Clinical lab tests: ordered and reviewed  Tests in the radiology section of CPT®: ordered and reviewed          Disposition  Final diagnoses:   Acute cystitis without hematuria   Gastroenteritis     Time reflects when diagnosis was documented in both MDM as applicable and the Disposition within this note     Time User Action Codes Description Comment    12/21/2019  5:35 PM Nita Marcellus Add [K85 90] Pancreatitis     12/21/2019  5:35 PM Nita Portland Add [N30 00] Acute cystitis without hematuria     12/21/2019  5:38 PM Nita Marcellus Modify [N30 00] Acute cystitis without hematuria     12/21/2019  5:38 PM Nita Marcellus Remove [K85 90] Pancreatitis     12/21/2019  5:38 PM Nita Portland Add [K52 9] Gastroenteritis       ED Disposition     ED Disposition Condition Date/Time Comment    Discharge Stable Sat Dec 21, 2019  5:34 PM Thelma Styles discharge to home/self care  Follow-up Information    None         Discharge Medication List as of 12/21/2019  5:39 PM      START taking these medications    Details   nitrofurantoin (MACROBID) 100 mg capsule Take 1 capsule (100 mg total) by mouth 2 (two) times a day, Starting Sat 12/21/2019, Normal      ondansetron (ZOFRAN) 4 mg tablet Take 1 tablet (4 mg total) by mouth every 6 (six) hours, Starting Sat 12/21/2019, Normal         CONTINUE these medications which have NOT CHANGED    Details   carBAMazepine (TEGRETOL-XR) 100 mg 12 hr tablet TAKE 2 TABLETS IN THE MORNING, 1 TABLET MID-DAY, AND 2 TABLETS AT BEDTIME, Historical Med      desogestrel-ethinyl estradiol (KARIVA) 0 15-0 02/0 01 MG (21/5) per tablet Take 1 tablet by mouth, Starting Sun 2/25/2018, Historical Med      fexofenadine-pseudoephedrine (ALLEGRA-D)  MG per tablet Take 1 tablet by mouth, Starting Tue 10/15/2019, Historical Med      fluticasone (FLONASE) 50 mcg/act nasal spray ADMINISTER 2 SPRAYS INTO EACH NOSTRIL DAILY  , Historical Med      gabapentin (NEURONTIN) 300 mg capsule TAKE ONE CAPSULE BY MOUTH EVERY EVENING, Historical Med      modafinil (PROVIGIL) 100 mg tablet Take 100 mg by mouth daily, Starting Mon 11/11/2019, Historical Med      Ocrelizumab (OCREVUS IV) Infuse into a venous catheter, Historical Med      topiramate (TOPAMAX) 50 MG tablet Take 50 mg by mouth, Starting Wed 7/15/2015, Historical Med           No discharge procedures on file      ED Provider  Electronically Signed by           Joe Caballero PA-C  12/22/19 4636

## 2020-08-14 ENCOUNTER — APPOINTMENT (EMERGENCY)
Dept: MRI IMAGING | Facility: HOSPITAL | Age: 40
End: 2020-08-14
Payer: COMMERCIAL

## 2020-08-14 ENCOUNTER — HOSPITAL ENCOUNTER (EMERGENCY)
Facility: HOSPITAL | Age: 40
Discharge: HOME | End: 2020-08-14
Attending: EMERGENCY MEDICINE | Admitting: EMERGENCY MEDICINE
Payer: COMMERCIAL

## 2020-08-14 ENCOUNTER — APPOINTMENT (EMERGENCY)
Dept: RADIOLOGY | Facility: HOSPITAL | Age: 40
End: 2020-08-14
Payer: COMMERCIAL

## 2020-08-14 ENCOUNTER — APPOINTMENT (EMERGENCY)
Dept: CT IMAGING | Facility: HOSPITAL | Age: 40
End: 2020-08-14
Payer: COMMERCIAL

## 2020-08-14 VITALS
RESPIRATION RATE: 20 BRPM | HEART RATE: 78 BPM | SYSTOLIC BLOOD PRESSURE: 118 MMHG | TEMPERATURE: 98 F | DIASTOLIC BLOOD PRESSURE: 68 MMHG | OXYGEN SATURATION: 98 % | BODY MASS INDEX: 22.55 KG/M2 | WEIGHT: 144 LBS

## 2020-08-14 DIAGNOSIS — N30.90 CYSTITIS: ICD-10-CM

## 2020-08-14 DIAGNOSIS — R20.2 TINGLING OF BOTH FEET: ICD-10-CM

## 2020-08-14 DIAGNOSIS — R20.2 TINGLING OF FACE: ICD-10-CM

## 2020-08-14 DIAGNOSIS — R20.2 TINGLING OF BOTH UPPER EXTREMITIES: ICD-10-CM

## 2020-08-14 DIAGNOSIS — R51.9 HEADACHE: Primary | ICD-10-CM

## 2020-08-14 LAB
AMORPH PHOS CRY URNS QL MICRO: ABNORMAL /HPF
ANION GAP SERPL CALCULATED.3IONS-SCNC: 11 MMOL/L (ref 4–13)
ATRIAL RATE: 60 BPM
BACTERIA UR QL AUTO: ABNORMAL /HPF
BASOPHILS # BLD AUTO: 0.03 THOUSANDS/ΜL (ref 0–0.1)
BASOPHILS NFR BLD AUTO: 0 % (ref 0–1)
BILIRUB UR QL STRIP: NEGATIVE
BUN SERPL-MCNC: 10 MG/DL (ref 5–25)
CALCIUM SERPL-MCNC: 9.4 MG/DL (ref 8.3–10.1)
CHLORIDE SERPL-SCNC: 103 MMOL/L (ref 100–108)
CLARITY UR: ABNORMAL
CO2 SERPL-SCNC: 25 MMOL/L (ref 21–32)
COLOR UR: YELLOW
CREAT SERPL-MCNC: 0.96 MG/DL (ref 0.6–1.3)
EOSINOPHIL # BLD AUTO: 0.04 THOUSAND/ΜL (ref 0–0.61)
EOSINOPHIL NFR BLD AUTO: 0 % (ref 0–6)
ERYTHROCYTE [DISTWIDTH] IN BLOOD BY AUTOMATED COUNT: 13.2 % (ref 11.6–15.1)
EXT PREG TEST URINE: NEGATIVE
EXT. CONTROL ED NAV: NORMAL
GFR SERPL CREATININE-BSD FRML MDRD: 75 ML/MIN/1.73SQ M
GLUCOSE SERPL-MCNC: 103 MG/DL (ref 65–140)
GLUCOSE UR STRIP-MCNC: NEGATIVE MG/DL
HCT VFR BLD AUTO: 45.4 % (ref 34.8–46.1)
HGB BLD-MCNC: 15.1 G/DL (ref 11.5–15.4)
HGB UR QL STRIP.AUTO: NEGATIVE
IMM GRANULOCYTES # BLD AUTO: 0.05 THOUSAND/UL (ref 0–0.2)
IMM GRANULOCYTES NFR BLD AUTO: 0 % (ref 0–2)
KETONES UR STRIP-MCNC: NEGATIVE MG/DL
LEUKOCYTE ESTERASE UR QL STRIP: ABNORMAL
LIPASE SERPL-CCNC: 83 U/L (ref 73–393)
LYMPHOCYTES # BLD AUTO: 1.1 THOUSANDS/ΜL (ref 0.6–4.47)
LYMPHOCYTES NFR BLD AUTO: 9 % (ref 14–44)
MCH RBC QN AUTO: 30.1 PG (ref 26.8–34.3)
MCHC RBC AUTO-ENTMCNC: 33.3 G/DL (ref 31.4–37.4)
MCV RBC AUTO: 90 FL (ref 82–98)
MONOCYTES # BLD AUTO: 0.97 THOUSAND/ΜL (ref 0.17–1.22)
MONOCYTES NFR BLD AUTO: 8 % (ref 4–12)
MUCOUS THREADS UR QL AUTO: ABNORMAL
NEUTROPHILS # BLD AUTO: 10.23 THOUSANDS/ΜL (ref 1.85–7.62)
NEUTS SEG NFR BLD AUTO: 83 % (ref 43–75)
NITRITE UR QL STRIP: NEGATIVE
NON-SQ EPI CELLS URNS QL MICRO: ABNORMAL /HPF
NRBC BLD AUTO-RTO: 0 /100 WBCS
P AXIS: 36 DEGREES
PH UR STRIP.AUTO: 8 [PH]
PLATELET # BLD AUTO: 208 THOUSANDS/UL (ref 149–390)
PMV BLD AUTO: 11.6 FL (ref 8.9–12.7)
POTASSIUM SERPL-SCNC: 3.6 MMOL/L (ref 3.5–5.3)
PR INTERVAL: 108 MS
PROT UR STRIP-MCNC: NEGATIVE MG/DL
QRS AXIS: 108 DEGREES
QRSD INTERVAL: 104 MS
QT INTERVAL: 474 MS
QTC INTERVAL: 474 MS
RBC # BLD AUTO: 5.02 MILLION/UL (ref 3.81–5.12)
RBC #/AREA URNS AUTO: ABNORMAL /HPF
SODIUM SERPL-SCNC: 139 MMOL/L (ref 136–145)
SP GR UR STRIP.AUTO: 1.01 (ref 1–1.03)
T WAVE AXIS: 74 DEGREES
TROPONIN I SERPL-MCNC: <0.02 NG/ML
TSH SERPL DL<=0.05 MIU/L-ACNC: 0.82 UIU/ML (ref 0.36–3.74)
UROBILINOGEN UR QL STRIP.AUTO: 1 E.U./DL
VENTRICULAR RATE: 60 BPM
WBC # BLD AUTO: 12.42 THOUSAND/UL (ref 4.31–10.16)
WBC #/AREA URNS AUTO: ABNORMAL /HPF

## 2020-08-14 PROCEDURE — 36415 COLL VENOUS BLD VENIPUNCTURE: CPT | Performed by: EMERGENCY MEDICINE

## 2020-08-14 PROCEDURE — 70450 CT HEAD/BRAIN W/O DYE: CPT

## 2020-08-14 PROCEDURE — 85025 COMPLETE CBC W/AUTO DIFF WBC: CPT | Performed by: EMERGENCY MEDICINE

## 2020-08-14 PROCEDURE — 72156 MRI NECK SPINE W/O & W/DYE: CPT

## 2020-08-14 PROCEDURE — 70553 MRI BRAIN STEM W/O & W/DYE: CPT

## 2020-08-14 PROCEDURE — 99284 EMERGENCY DEPT VISIT MOD MDM: CPT

## 2020-08-14 PROCEDURE — 96375 TX/PRO/DX INJ NEW DRUG ADDON: CPT

## 2020-08-14 PROCEDURE — 81001 URINALYSIS AUTO W/SCOPE: CPT | Performed by: EMERGENCY MEDICINE

## 2020-08-14 PROCEDURE — 96361 HYDRATE IV INFUSION ADD-ON: CPT

## 2020-08-14 PROCEDURE — 80048 BASIC METABOLIC PNL TOTAL CA: CPT | Performed by: EMERGENCY MEDICINE

## 2020-08-14 PROCEDURE — 87086 URINE CULTURE/COLONY COUNT: CPT | Performed by: EMERGENCY MEDICINE

## 2020-08-14 PROCEDURE — 87186 SC STD MICRODIL/AGAR DIL: CPT | Performed by: EMERGENCY MEDICINE

## 2020-08-14 PROCEDURE — 96374 THER/PROPH/DIAG INJ IV PUSH: CPT

## 2020-08-14 PROCEDURE — 87077 CULTURE AEROBIC IDENTIFY: CPT | Performed by: EMERGENCY MEDICINE

## 2020-08-14 PROCEDURE — 83690 ASSAY OF LIPASE: CPT | Performed by: EMERGENCY MEDICINE

## 2020-08-14 PROCEDURE — 93005 ELECTROCARDIOGRAM TRACING: CPT

## 2020-08-14 PROCEDURE — G1004 CDSM NDSC: HCPCS

## 2020-08-14 PROCEDURE — 81025 URINE PREGNANCY TEST: CPT | Performed by: EMERGENCY MEDICINE

## 2020-08-14 PROCEDURE — 71045 X-RAY EXAM CHEST 1 VIEW: CPT

## 2020-08-14 PROCEDURE — 84443 ASSAY THYROID STIM HORMONE: CPT | Performed by: EMERGENCY MEDICINE

## 2020-08-14 PROCEDURE — 99285 EMERGENCY DEPT VISIT HI MDM: CPT | Performed by: EMERGENCY MEDICINE

## 2020-08-14 PROCEDURE — 84484 ASSAY OF TROPONIN QUANT: CPT | Performed by: EMERGENCY MEDICINE

## 2020-08-14 PROCEDURE — A9585 GADOBUTROL INJECTION: HCPCS | Performed by: EMERGENCY MEDICINE

## 2020-08-14 RX ORDER — DEXAMETHASONE SODIUM PHOSPHATE 4 MG/ML
8 INJECTION, SOLUTION INTRA-ARTICULAR; INTRALESIONAL; INTRAMUSCULAR; INTRAVENOUS; SOFT TISSUE ONCE
Status: COMPLETED | OUTPATIENT
Start: 2020-08-14 | End: 2020-08-14

## 2020-08-14 RX ORDER — KETOROLAC TROMETHAMINE 30 MG/ML
30 INJECTION, SOLUTION INTRAMUSCULAR; INTRAVENOUS ONCE
Status: COMPLETED | OUTPATIENT
Start: 2020-08-14 | End: 2020-08-14

## 2020-08-14 RX ORDER — METOCLOPRAMIDE HYDROCHLORIDE 5 MG/ML
10 INJECTION INTRAMUSCULAR; INTRAVENOUS ONCE
Status: COMPLETED | OUTPATIENT
Start: 2020-08-14 | End: 2020-08-14

## 2020-08-14 RX ORDER — SODIUM CHLORIDE 9 MG/ML
3 INJECTION INTRAVENOUS
Status: DISCONTINUED | OUTPATIENT
Start: 2020-08-14 | End: 2020-08-14 | Stop reason: HOSPADM

## 2020-08-14 RX ORDER — SULFAMETHOXAZOLE AND TRIMETHOPRIM 800; 160 MG/1; MG/1
1 TABLET ORAL 2 TIMES DAILY
Qty: 14 TABLET | Refills: 0 | Status: SHIPPED | OUTPATIENT
Start: 2020-08-14 | End: 2020-08-21

## 2020-08-14 RX ORDER — SULFAMETHOXAZOLE AND TRIMETHOPRIM 800; 160 MG/1; MG/1
1 TABLET ORAL ONCE
Status: COMPLETED | OUTPATIENT
Start: 2020-08-14 | End: 2020-08-14

## 2020-08-14 RX ADMIN — METOCLOPRAMIDE HYDROCHLORIDE 10 MG: 5 INJECTION INTRAMUSCULAR; INTRAVENOUS at 09:47

## 2020-08-14 RX ADMIN — KETOROLAC TROMETHAMINE 30 MG: 30 INJECTION, SOLUTION INTRAMUSCULAR at 09:44

## 2020-08-14 RX ADMIN — GADOBUTROL 6 ML: 604.72 INJECTION INTRAVENOUS at 15:23

## 2020-08-14 RX ADMIN — DEXAMETHASONE SODIUM PHOSPHATE 8 MG: 4 INJECTION, SOLUTION INTRAMUSCULAR; INTRAVENOUS at 09:42

## 2020-08-14 RX ADMIN — SODIUM CHLORIDE 1000 ML: 0.9 INJECTION, SOLUTION INTRAVENOUS at 09:42

## 2020-08-14 RX ADMIN — SULFAMETHOXAZOLE AND TRIMETHOPRIM 1 TABLET: 800; 160 TABLET ORAL at 17:43

## 2020-08-14 NOTE — Clinical Note
Camilla Romancorinne was seen and treated in our emergency department on 8/14/2020  Diagnosis:     Terri Thomas  may return to work on return date  She may return on 08/17/2020  If you have any questions or concerns, please don't hesitate to call        Guillaume Stoll MD    ______________________________           _______________          _______________  Hospital Representative                              Date                                Time

## 2020-08-14 NOTE — ED NOTES
Pt verbalized understanding of wait time for MRI  Resting comfortably  Offering no c/o         Erica Mckinnon RN  08/14/20 2855

## 2020-08-14 NOTE — ED PROVIDER NOTES
History  Chief Complaint   Patient presents with    Headache     Pt w/hx of MS who had medication adjustment on Wednesday began with increased anxiety, HA with tingling in face, decreased appetite yesterday - HA /tingling persist today - pt states HA is typical of previous HA     72-year-old female with a past medical history of multiple sclerosis, trigeminal neuralgia, tobacco user, acoustic neuroma, nephrolithiasis, status post appendectomy, status post hysterectomy presents with headache, photophobia that started last night and increased anxiety with decreased oral intake with associated bilateral facial tingling and upper and lower extremity bilateral tingling since yesterday afternoon  Patient states she saw her neurologist two days ago at [facility] for her multiple sclerosis and trigeminal neuralgia  Her home medication modafinil dosage was increased two days ago  Patient states that she started to hyperventilate due to her anxiety around 2:30 p m  Yesterday due to her life stresses and started having facial, arm and leg tingling  Patient states that she also has multiple sclerosis and has had similar symptoms in the past but not in the last few years  Patient is ambulatory and is not wheelchair bound at this point  Patient denies history of stroke or transient ischemic attack  Patient states her headache started last night with gradual onset and is similar to previous headaches although it has not resolved with her home sumatriptan  Patient denies fever, phonophobia, chills, neck stiffness, neck pain, myalgias, nausea, vomiting, cough, sputum production, chest pain, shortness of breath, rash, back pain, abdominal pain, vaginal bleeding or discharge, urinary symptoms or diarrhea  Patient denies saddle anesthesia, focal motor or sensory deficits, urinary retention or urinary or fecal incontinence  No known COVID-19 exposures    Dr Hernandez Adjutant wore PPE during clinical evaluation due to COVID-19 pandemic including Bonnet, eye goggles, face mask and gloves        History provided by:  Patient and significant other   used: No    Headache - Recurrent or Known Dx Migraines   Pain location:  Generalized  Quality:  Dull  Radiates to:  Does not radiate  Severity currently:  8/10  Onset quality:  Gradual  Duration:  1 day  Timing:  Intermittent  Progression:  Waxing and waning  Chronicity:  Recurrent  Similar to prior headaches: yes    Context: bright light and emotional stress    Context: not activity, not caffeine, not coughing, not defecating, not eating, not exposure to cold air, not intercourse, not loud noise and not straining    Relieved by:  Nothing  Worsened by:  Nothing  Ineffective treatments:  DHE and NSAIDs  Associated symptoms: blurred vision (History of MS), loss of balance ( history of MS), nausea, numbness (Tingling of bilateral face, bilateral arms and legs), paresthesias ( bilateral face, upper extremities bilaterally and lower extremities bilaterally; history of MS), photophobia and tingling ( history of MS)    Associated symptoms: no abdominal pain, no back pain, no congestion, no cough, no diarrhea, no dizziness, no drainage, no ear pain, no eye pain, no facial pain, no fatigue, no fever, no focal weakness, no hearing loss, no myalgias, no near-syncope, no neck pain, no neck stiffness, no seizures, no sinus pressure, no sore throat, no swollen glands, no syncope, no URI, no visual change, no vomiting and no weakness    Risk factors: no anger, no family hx of SAH, does not have insomnia and lifestyle not sedentary    Neurologic Problem   Associated symptoms: headaches and nausea    Associated symptoms: no abdominal pain, no chest pain, no congestion, no cough, no diarrhea, no ear pain, no fatigue, no fever, no myalgias, no rash, no rhinorrhea, no shortness of breath, no sore throat, no vomiting and no wheezing        Prior to Admission Medications   Prescriptions Last Dose Informant Patient Reported? Taking? Ocrelizumab (OCREVUS IV)   Yes No   Sig: Infuse into a venous catheter   carBAMazepine (TEGRETOL-XR) 100 mg 12 hr tablet   Yes No   Sig: TAKE 2 TABLETS IN THE MORNING, 1 TABLET MID-DAY, AND 2 TABLETS AT BEDTIME   desogestrel-ethinyl estradiol (KARIVA) 0 15-0 02/0 01 MG (21/5) per tablet   Yes No   Sig: Take 1 tablet by mouth   fexofenadine-pseudoephedrine (ALLEGRA-D)  MG per tablet   Yes No   Sig: Take 1 tablet by mouth   fluticasone (FLONASE) 50 mcg/act nasal spray   Yes No   Sig: ADMINISTER 2 SPRAYS INTO EACH NOSTRIL DAILY  gabapentin (NEURONTIN) 300 mg capsule   Yes No   Sig: TAKE ONE CAPSULE BY MOUTH EVERY EVENING   modafinil (PROVIGIL) 100 mg tablet   Yes No   Sig: Take 100 mg by mouth daily   nitrofurantoin (MACROBID) 100 mg capsule   No No   Sig: Take 1 capsule (100 mg total) by mouth 2 (two) times a day   ondansetron (ZOFRAN) 4 mg tablet   No No   Sig: Take 1 tablet (4 mg total) by mouth every 6 (six) hours   topiramate (TOPAMAX) 50 MG tablet   Yes No   Sig: Take 50 mg by mouth      Facility-Administered Medications: None       Past Medical History:   Diagnosis Date    Acoustic neuroma [facility] [address] )     Kidney stones     [facility]     Trigeminal neuralgia        Past Surgical History:   Procedure Laterality Date    APPENDECTOMY      HYSTERECTOMY      KIDNEY STONE SURGERY      several times    OTHER SURGICAL HISTORY      kidney stones       History reviewed  No pertinent family history  I have reviewed and agree with the history as documented  E-Cigarette/Vaping    E-Cigarette Use Never User      E-Cigarette/Vaping Substances     Social History     Tobacco Use    Smoking status: Current Every Day Smoker    Smokeless tobacco: Never Used   Substance Use Topics    Alcohol use: Not Currently    Drug use: Not Currently       Review of Systems   Constitutional: Positive for appetite change  Negative for chills, diaphoresis, fatigue and fever     HENT: Negative for congestion, dental problem, drooling, ear discharge, ear pain, facial swelling, hearing loss, mouth sores, nosebleeds, postnasal drip, rhinorrhea, sinus pressure, sinus pain, sneezing, sore throat, trouble swallowing and voice change  Eyes: Positive for blurred vision (History of MS), photophobia and visual disturbance  Negative for pain  Respiratory: Negative for cough, chest tightness, shortness of breath, wheezing and stridor  Cardiovascular: Negative for chest pain, palpitations, leg swelling, syncope and near-syncope  Gastrointestinal: Positive for nausea  Negative for abdominal distention, abdominal pain, blood in stool, constipation, diarrhea and vomiting  Genitourinary: Negative for decreased urine volume, difficulty urinating, dysuria, flank pain, frequency, hematuria, menstrual problem, pelvic pain, urgency, vaginal bleeding, vaginal discharge and vaginal pain  Musculoskeletal: Negative for arthralgias, back pain, gait problem, joint swelling, myalgias, neck pain and neck stiffness  Skin: Negative for color change, pallor, rash and wound  Allergic/Immunologic: Negative for immunocompromised state  Neurological: Positive for numbness (Tingling of bilateral face, bilateral arms and legs), headaches, paresthesias ( bilateral face, upper extremities bilaterally and lower extremities bilaterally; history of MS) and loss of balance ( history of MS)  Negative for dizziness, tremors, focal weakness, seizures, syncope, facial asymmetry, speech difficulty, weakness and light-headedness  Hematological: Negative for adenopathy  Psychiatric/Behavioral: Negative for agitation, confusion, hallucinations and suicidal ideas  The patient is nervous/anxious  Physical Exam  Physical Exam  Vitals signs and nursing note reviewed  Constitutional:       General: She is not in acute distress  Appearance: Normal appearance  She is well-developed and normal weight   She is not ill-appearing, toxic-appearing or diaphoretic  HENT:      Head: Normocephalic and atraumatic  Jaw: There is normal jaw occlusion  Right Ear: Hearing, tympanic membrane, ear canal and external ear normal  No drainage or tenderness  There is no impacted cerumen  No hemotympanum  Tympanic membrane is not injected, scarred, perforated, erythematous, retracted or bulging  Tympanic membrane has normal mobility  Left Ear: Hearing, tympanic membrane, ear canal and external ear normal  No drainage or tenderness  There is no impacted cerumen  No hemotympanum  Tympanic membrane is not injected, scarred, perforated, erythematous, retracted or bulging  Tympanic membrane has normal mobility  Nose: Nose normal  No congestion or rhinorrhea  Right Sinus: No maxillary sinus tenderness or frontal sinus tenderness  Left Sinus: No maxillary sinus tenderness or frontal sinus tenderness  Mouth/Throat:      Lips: Pink  No lesions  Mouth: Mucous membranes are moist  No injury, lacerations, oral lesions or angioedema  Tongue: No lesions  Tongue does not deviate from midline  Palate: No mass and lesions  Pharynx: Oropharynx is clear  Uvula midline  No pharyngeal swelling, oropharyngeal exudate, posterior oropharyngeal erythema or uvula swelling  Tonsils: No tonsillar exudate or tonsillar abscesses  Eyes:      General: Lids are normal  Vision grossly intact  Gaze aligned appropriately  No visual field deficit  Extraocular Movements: Extraocular movements intact  Right eye: No nystagmus  Left eye: No nystagmus  Conjunctiva/sclera: Conjunctivae normal       Pupils: Pupils are equal, round, and reactive to light  Neck:      Musculoskeletal: Full passive range of motion without pain, normal range of motion and neck supple  Normal range of motion  No edema, erythema, neck rigidity, pain with movement, spinous process tenderness or muscular tenderness        Thyroid: No thyroid mass, thyromegaly or thyroid tenderness  Trachea: Trachea and phonation normal       Meningeal: Brudzinski's sign and Kernig's sign absent  Cardiovascular:      Rate and Rhythm: Normal rate and regular rhythm  Pulses: Normal pulses  Radial pulses are 2+ on the right side and 2+ on the left side  Dorsalis pedis pulses are 2+ on the right side and 2+ on the left side  Heart sounds: Normal heart sounds, S1 normal and S2 normal    Pulmonary:      Effort: Pulmonary effort is normal  No tachypnea, accessory muscle usage, respiratory distress or retractions  Breath sounds: Normal breath sounds and air entry  No stridor, decreased air movement or transmitted upper airway sounds  No decreased breath sounds, wheezing, rhonchi or rales  Chest:      Chest wall: No mass, deformity or tenderness  Abdominal:      General: Abdomen is flat  Bowel sounds are normal  There is no distension  Palpations: Abdomen is soft  Abdomen is not rigid  There is no mass  Tenderness: There is no abdominal tenderness  There is no right CVA tenderness, left CVA tenderness, guarding or rebound  Negative signs include Arredondo's sign and McBurney's sign  Hernia: No hernia is present  Musculoskeletal: Normal range of motion  General: No swelling, tenderness, deformity or signs of injury  Right lower leg: No edema  Left lower leg: No edema  Lymphadenopathy:      Cervical: No cervical adenopathy  Skin:     General: Skin is warm and dry  Capillary Refill: Capillary refill takes less than 2 seconds  Coloration: Skin is not ashen, cyanotic, jaundiced, mottled, pale or sallow  Findings: No abrasion, abscess, acne, bruising, burn, ecchymosis, erythema, signs of injury, laceration, lesion, petechiae or rash  Rash is not macular or papular  Neurological:      General: No focal deficit present        Mental Status: She is alert and oriented to person, place, and time  Mental status is at baseline  She is not disoriented  GCS: GCS eye subscore is 4  GCS verbal subscore is 5  GCS motor subscore is 6  Cranial Nerves: No cranial nerve deficit, dysarthria or facial asymmetry  Sensory: Sensation is intact  No sensory deficit  Motor: Motor function is intact  No weakness, tremor, atrophy, abnormal muscle tone, seizure activity or pronator drift  Coordination: Coordination is intact  Coordination normal  Finger-Nose-Finger Test normal       Gait: Gait is intact  Gait and tandem walk normal       Comments: Patient is AAOx4, GCS 15; speaking clearly and appropriately; motor and sensation intact; visual fields intact; cranial nerves II-XII grossly intact; no facial droop, slurred speech or arm drift; NIH 0   Psychiatric:         Attention and Perception: Attention and perception normal  She is attentive  Mood and Affect: Mood and affect normal          Speech: Speech normal          Behavior: Behavior normal  Behavior is cooperative  Thought Content:  Thought content normal          Cognition and Memory: Cognition and memory normal          Judgment: Judgment normal          Vital Signs  ED Triage Vitals   Temperature Pulse Respirations Blood Pressure SpO2   08/14/20 0912 08/14/20 0912 08/14/20 0912 08/14/20 0912 08/14/20 0912   98 °F (36 7 °C) 64 18 144/84 99 %      Temp Source Heart Rate Source Patient Position - Orthostatic VS BP Location FiO2 (%)   08/14/20 0912 08/14/20 0912 08/14/20 0912 08/14/20 0912 --   Temporal Monitor Lying Right arm       Pain Score       08/14/20 0944       7           Vitals:    08/14/20 1220 08/14/20 1400 08/14/20 1539 08/14/20 1752   BP: 122/70 112/62 121/74 118/68   Pulse: 75 72 74 78   Patient Position - Orthostatic VS:   Sitting          Visual Acuity      ED Medications  Medications   sodium chloride 0 9 % bolus 1,000 mL (0 mL Intravenous Stopped 8/14/20 1224)   metoclopramide (REGLAN) injection 10 mg (10 mg Intravenous Given 8/14/20 0947)   dexamethasone (DECADRON) injection 8 mg (8 mg Intravenous Given 8/14/20 0942)   ketorolac (TORADOL) injection 30 mg (30 mg Intravenous Given 8/14/20 0944)   sulfamethoxazole-trimethoprim (BACTRIM DS) 800-160 mg per tablet 1 tablet (1 tablet Oral Given 8/14/20 1743)   Gadobutrol injection (SINGLE-DOSE) SOLN 6 mL (6 mL Intravenous Given 8/14/20 1523)       Diagnostic Studies  Results Reviewed     Procedure Component Value Units Date/Time    Urine culture [[id number]]  (Abnormal) Collected:  08/14/20 1238    Lab Status:  Preliminary result Specimen:  Urine, Clean Catch Updated:  08/15/20 0736     Urine Culture >100,000 cfu/ml Gram Negative Timothy Enteric Like    POCT pregnancy, urine [[id number]]  (Normal) Resulted:  08/14/20 1500    Lab Status:  Final result Updated:  08/14/20 1752     EXT PREG TEST UR (Ref: Negative) negative     Control valid    Urine Microscopic [[id number]]  (Abnormal) Collected:  08/14/20 1238    Lab Status:  Final result Specimen:  Urine, Clean Catch Updated:  08/14/20 1253     RBC, UA 0-1 /hpf      WBC, UA 10-20 /hpf      Epithelial Cells Moderate /hpf      Bacteria, UA Innumerable /hpf      AMORPH PHOSPATES Innumerable /hpf      MUCUS THREADS Occasional    UA w Reflex to Microscopic w Reflex to Culture [[id number]]  (Abnormal) Collected:  08/14/20 1238    Lab Status:  Final result Specimen:  Urine, Clean Catch Updated:  08/14/20 1245     Color, UA Yellow     Clarity, UA Cloudy     Specific Gravity, UA 1 015     pH, UA 8 0     Leukocytes, UA Trace     Nitrite, UA Negative     Protein, UA Negative mg/dl      Glucose, UA Negative mg/dl      Ketones, UA Negative mg/dl      Urobilinogen, UA 1 0 E U /dl      Bilirubin, UA Negative     Blood, UA Negative    Troponin I [[id number]]  (Normal) Collected:  08/14/20 1042    Lab Status:  Final result Specimen:  Blood from Arm, Right Updated:  08/14/20 1137     Troponin I <0 02 ng/mL     Basic metabolic panel [[id number]] Collected:  08/14/20 0941    Lab Status:  Final result Specimen:  Blood from Arm, Right Updated:  08/14/20 1019     Sodium 139 mmol/L      Potassium 3 6 mmol/L      Chloride 103 mmol/L      CO2 25 mmol/L      ANION GAP 11 mmol/L      BUN 10 mg/dL      Creatinine 0 96 mg/dL      Glucose 103 mg/dL      Calcium 9 4 mg/dL      eGFR 75 ml/min/1 73sq m     Narrative:       [place] guidelines for Chronic Kidney Disease (CKD):     Stage 1 with normal or high GFR (GFR > 90 mL/min/1 73 square meters)    Stage 2 Mild CKD (GFR = 60-89 mL/min/1 73 square meters)    Stage 3A Moderate CKD (GFR = 45-59 mL/min/1 73 square meters)    Stage 3B Moderate CKD (GFR = 30-44 mL/min/1 73 square meters)    Stage 4 Severe CKD (GFR = 15-29 mL/min/1 73 square meters)    Stage 5 End Stage CKD (GFR <15 mL/min/1 73 square meters)  Note: GFR calculation is accurate only with a steady state creatinine    Lipase [[id number]]  (Normal) Collected:  08/14/20 0941    Lab Status:  Final result Specimen:  Blood from Arm, Right Updated:  08/14/20 1019     Lipase 83 u/L     TSH, 3rd generation [[id number]]  (Normal) Collected:  08/14/20 0941    Lab Status:  Final result Specimen:  Blood from Arm, Right Updated:  08/14/20 1019     TSH 3RD GENERATON 0 821 uIU/mL     Narrative:       Patients undergoing fluorescein dye angiography may retain small amounts of fluorescein in the body for 48-72 hours post procedure  Samples containing fluorescein can produce falsely depressed TSH values  If the patient had this procedure,a specimen should be resubmitted post fluorescein clearance        CBC and differential [[id number]]  (Abnormal) Collected:  08/14/20 0941    Lab Status:  Final result Specimen:  Blood from Arm, Right Updated:  08/14/20 0952     WBC 12 42 Thousand/uL      RBC 5 02 Million/uL      Hemoglobin 15 1 g/dL      Hematocrit 45 4 %      MCV 90 fL      MCH 30 1 pg      MCHC 33 3 g/dL      RDW 13 2 %      MPV 11 6 fL Platelets 839 Thousands/uL      nRBC 0 /100 WBCs      Neutrophils Relative 83 %      Immat GRANS % 0 %      Lymphocytes Relative 9 %      Monocytes Relative 8 %      Eosinophils Relative 0 %      Basophils Relative 0 %      Neutrophils Absolute 10 23 Thousands/µL      Immature Grans Absolute 0 05 Thousand/uL      Lymphocytes Absolute 1 10 Thousands/µL      Monocytes Absolute 0 97 Thousand/µL      Eosinophils Absolute 0 04 Thousand/µL      Basophils Absolute 0 03 Thousands/µL                  MRI brain MS wo and w contrast   Final Result by Blanca Treviño MD (08/14 1639)      1  Moderate burden intracranial demyelinating disease  No enhancement to suggest active demyelination  Prior outside imaging is not available for comparison  2   Right CP angle 2 1 cm lesion most consistent with acoustic neuroma  Prior outside imaging is not available in PACS to evaluate interval change  Workstation performed: [id number]         MRI cervical spine w wo contrast   Final Result by Darlyn David MD (08/14 1535)   Mild multilevel degenerative facet/uncinate process arthrosis      No evidence of disc herniation, central canal or foraminal stenosis      Normal appearance cervical spinal cord  Specifically, no evidence to confirm MS      No pathologic enhancement            Workstation performed: [id number]         CT head without contrast   Final Result by Wili aGma MD (08/14 1032)      No acute intracranial abnormality  Workstation performed: [id number]         X-ray chest 1 view portable   Final Result by Joe Parsons MD (08/14 1044)      No acute cardiopulmonary disease  Workstation performed: [id number]                    Procedures  ECG 12 Lead Documentation Only    Date/Time: 8/14/2020 10:58 AM  Performed by: Keaton Avery MD  Authorized by:  Keaton Avery MD     Indications / Diagnosis:  Tingling, nausea  ECG reviewed by me, the ED Provider: yes    Patient location: ED  Previous ECG:     Comparison to cardiac monitor: Yes    Interpretation:     Interpretation: normal    Rate:     ECG rate:  60bpm    ECG rate assessment: normal    Rhythm:     Rhythm: sinus rhythm    Ectopy:     Ectopy: none    QRS:     QRS axis:  Right  Conduction:     Conduction: abnormal      Abnormal conduction: incomplete RBBB    ST segments:     ST segments:  Normal  T waves:     T waves: flattening and inverted      Flattening: I    Inverted:  AVR, aVL and V1  Comments:      No STEMI  NV 108ms  QRS 104ms  QT 474ms             ED Course  ED Course as of Aug 15 1414   Fri Aug 14, 2020   8907 Texted Dr Iesha Dawkins, Neurologist about patient's neuro symptoms  Normal exam  NIH 0      1020 Dr Iesha Dawkins requests MRI brain and cervical spine with and without contrast  No steroid until MRI has resulted  2000 [facility] patient  Her headache is now 1/10 after treatment  She states that her facial, bilateral upper and lower extremity tingling has decreased  Updated her on plan for MRI brain and cervical spine per Dr Iesha Dawkins, neurologist       9010 CTH:IMPRESSION:     No acute intracranial abnormality          1047 CXR:IMPRESSION:     No acute cardiopulmonary disease          1201 Reassessed patient  She has no headache this time and tingling has resolved  Informed her that MRI brain and cervical spine will be at 1:30 p m  Deanna Biancih 1618 MRI cervical spine:IMPRESSION:  Mild multilevel degenerative facet/uncinate process arthrosis     No evidence of disc herniation, central canal or foraminal stenosis     Normal appearance cervical spinal cord  Specifically, no evidence to confirm MS     No pathologic enhancement         1644 MRI brain:IMPRESSION:     1  Moderate burden intracranial demyelinating disease  No enhancement to suggest active demyelination  Prior outside imaging is not available for comparison      2  Right CP angle 2 1 cm lesion most consistent with acoustic neuroma    Prior outside imaging is not available in PACS to evaluate interval change      Workstation performed: [id number]               1644 Texted Dr Destiny Cordero results from MRI cervical spine and brain for his recommendations  [address] Destiny Cordero has reviewed imaging and patient can be discharged with outpatient neuro follow-up with her established neurologist at [facility]  [id number] patient  She has 1/10 headache but would like to be discharged to home and will take her home medications  Reviewed labs, imaging and will discharge to home at this time  Family will pick her up  PCP and neurology follow-up  Strict return to ER precautions discussed with and acknowledged by patient  Will start her on antibiotic for her cystitis  US AUDIT      Most Recent Value   Initial Alcohol Screen: US AUDIT-C    1  How often do you have a drink containing alcohol?  0 Filed at: 08/14/2020 0917   2  How many drinks containing alcohol do you have on a typical day you are drinking? 0 Filed at: 08/14/2020 0917   3b  FEMALE Any Age, or MALE 65+: How often do you have 4 or more drinks on one occassion?   0 Filed at: 08/14/2020 0917   Audit-C Score  0 Filed at: 08/14/2020 4586        MDM  Number of Diagnoses or Management Options  Headache:      Amount and/or Complexity of Data Reviewed  Clinical lab tests: reviewed and ordered  Tests in the radiology section of CPT®: reviewed and ordered  Tests in the medicine section of CPT®: ordered and reviewed  Review and summarize past medical records: yes  Discuss the patient with other providers: yes (Neurology, Dr Destiny Cordero)  Independent visualization of images, tracings, or specimens: yes (CTH, MRI, EKG)    Risk of Complications, Morbidity, and/or Mortality  Presenting problems: moderate  Diagnostic procedures: moderate  Management options: moderate    Patient Progress  Patient progress: stable        Disposition  Final diagnoses:   Headache   Tingling of both upper extremities   Tingling of face   Tingling of both feet   Cystitis     Time reflects when diagnosis was documented in both MDM as applicable and the Disposition within this note     Time User Action Codes Description Comment    8/14/2020 10:22 AM Marlyse Lions Add [R51] Headache     8/14/2020  1:33 PM Marlyse Lions Add [R20 2] Tingling of both upper extremities     8/14/2020  1:33 PM Marlyse Lions Add [R20 2] Tingling of face     8/14/2020  1:34 PM Marlyse Lions Add [R20 2] Tingling of both feet     8/14/2020  1:34 PM Marlyse Lions Add [N30 90] Cystitis       ED Disposition     ED Disposition Condition Date/Time Comment    Discharge Stable Fri Aug 14, 2020  5:36 PM Makayla Umana discharge to home/self care              Follow-up Information     Follow up With Specialties Details Why Contact Info    Ashley Ring MD Family Medicine Call in 1 day As needed, If symptoms worsen [place] [place] [address] [phone]      [place] [facility] Nurse Practitioner Call in 1 day As needed, If symptoms worsen [facility] [address] [address] [phone]            Discharge Medication List as of 8/14/2020  5:37 PM      START taking these medications    Details   sulfamethoxazole-trimethoprim (BACTRIM DS) 800-160 mg per tablet Take 1 tablet by mouth 2 (two) times a day for 7 days smx-tmp DS (BACTRIM) 800-160 mg tabs (1tab q12 D10), Starting Fri 8/14/2020, Until Fri 8/21/2020, Print         CONTINUE these medications which have NOT CHANGED    Details   carBAMazepine (TEGRETOL-XR) 100 mg 12 hr tablet TAKE 2 TABLETS IN THE MORNING, 1 TABLET MID-DAY, AND 2 TABLETS AT BEDTIME, Historical Med      desogestrel-ethinyl estradiol (Lucyann Mungo) 0 15-0 02/0 01 MG (21/5) per tablet Take 1 tablet by mouth, Starting Sun 2/25/2018, Historical Med      fexofenadine-pseudoephedrine (ALLEGRA-D)  MG per tablet Take 1 tablet by mouth, Starting Tue 10/15/2019, Historical Med      fluticasone (FLONASE) 50 mcg/act nasal spray ADMINISTER 2 SPRAYS INTO EACH NOSTRIL DAILY  , Historical Med      gabapentin (NEURONTIN) 300 mg capsule TAKE ONE CAPSULE BY MOUTH EVERY EVENING, Historical Med      modafinil (PROVIGIL) 100 mg tablet Take 100 mg by mouth daily, Starting Mon 11/11/2019, Historical Med      nitrofurantoin (MACROBID) 100 mg capsule Take 1 capsule (100 mg total) by mouth 2 (two) times a day, Starting Sat 12/21/2019, Normal      Ocrelizumab (OCREVUS IV) Infuse into a venous catheter, Historical Med      ondansetron (ZOFRAN) 4 mg tablet Take 1 tablet (4 mg total) by mouth every 6 (six) hours, Starting Sat 12/21/2019, Normal      topiramate (TOPAMAX) 50 MG tablet Take 50 mg by mouth, Starting Wed 7/15/2015, Historical Med           No discharge procedures on file      PDMP Review     None          ED Provider  Electronically Signed by    MD Jovita Dooley MD  08/15/20 6539

## 2020-08-16 ENCOUNTER — TELEPHONE (OUTPATIENT)
Dept: EMERGENCY DEPT | Facility: HOSPITAL | Age: 40
End: 2020-08-16

## 2020-08-16 DIAGNOSIS — N39.0 UTI (URINARY TRACT INFECTION): Primary | ICD-10-CM

## 2020-08-16 LAB — BACTERIA UR CULT: ABNORMAL

## 2020-08-16 RX ORDER — NITROFURANTOIN 25; 75 MG/1; MG/1
100 CAPSULE ORAL 2 TIMES DAILY
Qty: 14 CAPSULE | Refills: 0 | Status: SHIPPED | OUTPATIENT
Start: 2020-08-16 | End: 2020-08-23

## 2022-01-26 ENCOUNTER — DOCTOR'S OFFICE (OUTPATIENT)
Dept: URBAN - NONMETROPOLITAN AREA CLINIC 1 | Facility: CLINIC | Age: 42
Setting detail: OPHTHALMOLOGY
End: 2022-01-26
Payer: COMMERCIAL

## 2022-01-26 DIAGNOSIS — H53.122: ICD-10-CM

## 2022-01-26 DIAGNOSIS — H25.011: ICD-10-CM

## 2022-01-26 DIAGNOSIS — H46.8: ICD-10-CM

## 2022-01-26 DIAGNOSIS — H35.033: ICD-10-CM

## 2022-01-26 DIAGNOSIS — H04.123: ICD-10-CM

## 2022-01-26 DIAGNOSIS — H53.2: ICD-10-CM

## 2022-01-26 DIAGNOSIS — G37.9: ICD-10-CM

## 2022-01-26 PROCEDURE — 92083 EXTENDED VISUAL FIELD XM: CPT | Performed by: OPHTHALMOLOGY

## 2022-01-26 PROCEDURE — 92060 SENSORIMOTOR EXAMINATION: CPT | Performed by: OPHTHALMOLOGY

## 2022-01-26 PROCEDURE — 99204 OFFICE O/P NEW MOD 45 MIN: CPT | Performed by: OPHTHALMOLOGY

## 2022-01-26 ASSESSMENT — CONFRONTATIONAL VISUAL FIELD TEST (CVF)
OD_FINDINGS: FULL
OS_FINDINGS: FULL

## 2022-02-01 ASSESSMENT — SPHEQUIV_DERIVED
OD_SPHEQUIV: 0.125
OS_SPHEQUIV: 0.25
OS_SPHEQUIV: 0.125
OD_SPHEQUIV: 0.25

## 2022-02-01 ASSESSMENT — REFRACTION_MANIFEST
OD_VA1: 20/20-2
OS_VA1: 20/20-2
OS_VA2: 20/20-2
OS_SPHERE: +0.50
OD_SPHERE: +0.50
OD_VA2: 20/20-2
OS_AXIS: 005
OS_CYLINDER: -0.75
OD_AXIS: 155
OD_CYLINDER: -0.75

## 2022-02-01 ASSESSMENT — AXIALLENGTH_DERIVED
OD_AL: 23.0071
OS_AL: 23.7276
OS_AL: 23.6785
OD_AL: 22.9609

## 2022-02-01 ASSESSMENT — KERATOMETRY
OS_K2POWER_DIOPTERS: 42.75
OD_AXISANGLE_DEGREES: 153
OD_K2POWER_DIOPTERS: 45.00
OD_K1POWER_DIOPTERS: 45.00
OS_K1POWER_DIOPTERS: 43.25
OS_AXISANGLE_DEGREES: 097

## 2022-02-01 ASSESSMENT — REFRACTION_AUTOREFRACTION
OS_CYLINDER: -0.50
OS_SPHERE: +0.50
OS_AXIS: 174
OD_AXIS: 147
OD_SPHERE: +0.50
OD_CYLINDER: -0.50

## 2022-02-01 ASSESSMENT — REFRACTION_CURRENTRX
OS_AXIS: 004
OS_SPHERE: +0.75
OD_OVR_VA: 20/
OS_CYLINDER: -0.50
OD_AXIS: 168
OS_OVR_VA: 20/
OD_SPHERE: +0.75
OD_CYLINDER: -0.75

## 2022-02-01 ASSESSMENT — VISUAL ACUITY
OS_BCVA: 20/30+2
OD_BCVA: 20/30

## 2022-02-02 ENCOUNTER — DOCTOR'S OFFICE (OUTPATIENT)
Dept: URBAN - NONMETROPOLITAN AREA CLINIC 1 | Facility: CLINIC | Age: 42
Setting detail: OPHTHALMOLOGY
End: 2022-02-02
Payer: COMMERCIAL

## 2022-02-02 DIAGNOSIS — H53.2: ICD-10-CM

## 2022-02-02 DIAGNOSIS — G37.9: ICD-10-CM

## 2022-02-02 DIAGNOSIS — H25.011: ICD-10-CM

## 2022-02-02 DIAGNOSIS — H46.8: ICD-10-CM

## 2022-02-02 DIAGNOSIS — H52.223: ICD-10-CM

## 2022-02-02 PROCEDURE — 92083 EXTENDED VISUAL FIELD XM: CPT | Performed by: OPHTHALMOLOGY

## 2022-02-02 PROCEDURE — 99213 OFFICE O/P EST LOW 20 MIN: CPT | Performed by: OPHTHALMOLOGY

## 2022-02-02 ASSESSMENT — REFRACTION_MANIFEST
OS_CYLINDER: -0.75
OS_SPHERE: +0.50
OS_AXIS: 005
OD_SPHERE: +0.50
OD_AXIS: 155
OD_CYLINDER: -0.75
OS_VA1: 20/20-2
OD_VA1: 20/20-2
OD_VA2: 20/20-2
OS_VA2: 20/20-2

## 2022-02-02 ASSESSMENT — TONOMETRY
OD_IOP_MMHG: 14
OS_IOP_MMHG: 14

## 2022-02-02 ASSESSMENT — AXIALLENGTH_DERIVED
OD_AL: 23.7742
OS_AL: 23.8678
OD_AL: 23.7742
OS_AL: 23.9679

## 2022-02-02 ASSESSMENT — REFRACTION_AUTOREFRACTION
OS_CYLINDER: -0.75
OS_SPHERE: +0.25
OD_AXIS: 174
OS_AXIS: 180
OD_SPHERE: +0.25
OD_CYLINDER: -0.25

## 2022-02-02 ASSESSMENT — REFRACTION_CURRENTRX
OD_CYLINDER: -0.75
OS_SPHERE: +0.75
OS_OVR_VA: 20/
OD_AXIS: 171
OS_AXIS: 006
OS_CYLINDER: -0.50
OD_OVR_VA: 20/
OD_SPHERE: +0.75

## 2022-02-02 ASSESSMENT — SPHEQUIV_DERIVED
OS_SPHEQUIV: 0.125
OD_SPHEQUIV: 0.125
OD_SPHEQUIV: 0.125
OS_SPHEQUIV: -0.125

## 2022-02-02 ASSESSMENT — KERATOMETRY
OS_K2POWER_DIOPTERS: 43.25
OS_K1POWER_DIOPTERS: 42.00
OD_K2POWER_DIOPTERS: 43.50
OD_AXISANGLE_DEGREES: 074
OD_K1POWER_DIOPTERS: 42.25
OS_AXISANGLE_DEGREES: 108

## 2022-02-02 ASSESSMENT — VISUAL ACUITY
OS_BCVA: 20/25
OD_BCVA: 20/40

## 2022-02-02 ASSESSMENT — CONFRONTATIONAL VISUAL FIELD TEST (CVF)
OS_FINDINGS: FULL
OD_FINDINGS: FULL

## 2022-02-23 ENCOUNTER — DOCTOR'S OFFICE (OUTPATIENT)
Dept: URBAN - NONMETROPOLITAN AREA CLINIC 1 | Facility: CLINIC | Age: 42
Setting detail: OPHTHALMOLOGY
End: 2022-02-23
Payer: COMMERCIAL

## 2022-02-23 DIAGNOSIS — H53.2: ICD-10-CM

## 2022-02-23 DIAGNOSIS — H46.8: ICD-10-CM

## 2022-02-23 DIAGNOSIS — G37.9: ICD-10-CM

## 2022-02-23 PROCEDURE — 99213 OFFICE O/P EST LOW 20 MIN: CPT | Performed by: OPHTHALMOLOGY

## 2022-02-23 PROCEDURE — 92083 EXTENDED VISUAL FIELD XM: CPT | Performed by: OPHTHALMOLOGY

## 2022-02-23 ASSESSMENT — CONFRONTATIONAL VISUAL FIELD TEST (CVF)
OD_FINDINGS: FULL
OS_FINDINGS: FULL

## 2022-02-25 ASSESSMENT — SPHEQUIV_DERIVED
OS_SPHEQUIV: 0.125
OS_SPHEQUIV: 0.125
OD_SPHEQUIV: 0.125
OD_SPHEQUIV: 0.25

## 2022-02-25 ASSESSMENT — REFRACTION_CURRENTRX
OS_SPHERE: +0.75
OS_AXIS: 006
OD_SPHERE: +0.75
OD_OVR_VA: 20/
OD_CYLINDER: -0.75
OS_CYLINDER: -0.50
OS_OVR_VA: 20/
OD_AXIS: 171

## 2022-02-25 ASSESSMENT — REFRACTION_MANIFEST
OS_CYLINDER: -0.75
OD_VA1: 20/20-2
OS_SPHERE: +0.50
OD_SPHERE: +0.50
OS_AXIS: 005
OS_VA1: 20/20-2
OD_CYLINDER: -0.75
OD_AXIS: 155
OS_VA2: 20/20-2
OD_VA2: 20/20-2

## 2022-02-25 ASSESSMENT — VISUAL ACUITY
OS_BCVA: 20/25+2
OD_BCVA: 20/25+2

## 2022-02-25 ASSESSMENT — KERATOMETRY
OD_K1POWER_DIOPTERS: 42.25
OS_AXISANGLE_DEGREES: 108
OD_K2POWER_DIOPTERS: 43.50
OD_AXISANGLE_DEGREES: 074
OS_K1POWER_DIOPTERS: 42.00
OS_K2POWER_DIOPTERS: 43.25

## 2022-02-25 ASSESSMENT — AXIALLENGTH_DERIVED
OD_AL: 23.7248
OS_AL: 23.8678
OD_AL: 23.7742
OS_AL: 23.8678

## 2022-02-25 ASSESSMENT — REFRACTION_AUTOREFRACTION
OS_CYLINDER: +1.25
OS_AXIS: 091
OD_SPHERE: +0.00
OD_CYLINDER: +0.50
OD_AXIS: 055
OS_SPHERE: -0.50

## 2022-03-02 ENCOUNTER — DOCTOR'S OFFICE (OUTPATIENT)
Dept: URBAN - NONMETROPOLITAN AREA CLINIC 1 | Facility: CLINIC | Age: 42
Setting detail: OPHTHALMOLOGY
End: 2022-03-02
Payer: COMMERCIAL

## 2022-03-02 ENCOUNTER — OPTICAL OFFICE (OUTPATIENT)
Dept: URBAN - NONMETROPOLITAN AREA CLINIC 4 | Facility: CLINIC | Age: 42
Setting detail: OPHTHALMOLOGY
End: 2022-03-02
Payer: COMMERCIAL

## 2022-03-02 DIAGNOSIS — H52.223: ICD-10-CM

## 2022-03-02 DIAGNOSIS — H52.03: ICD-10-CM

## 2022-03-02 PROBLEM — H04.121 DRY EYE; RIGHT EYE, LEFT EYE: Status: ACTIVE | Noted: 2017-04-07

## 2022-03-02 PROBLEM — H53.2 DIPLOPIA: Status: ACTIVE | Noted: 2022-01-26

## 2022-03-02 PROBLEM — H25.011 CORTICAL CATARACT; RIGHT EYE: Status: ACTIVE | Noted: 2022-01-26

## 2022-03-02 PROBLEM — H53.122: Status: ACTIVE | Noted: 2017-05-15

## 2022-03-02 PROBLEM — H35.033 HYPERTENSIVE RETINOPATHY ; BOTH EYES: Status: ACTIVE | Noted: 2017-04-07

## 2022-03-02 PROBLEM — H46.8: Status: ACTIVE | Noted: 2018-01-10

## 2022-03-02 PROBLEM — H04.122 DRY EYE; RIGHT EYE, LEFT EYE: Status: ACTIVE | Noted: 2017-04-07

## 2022-03-02 PROBLEM — H46.8: Status: ACTIVE | Noted: 2022-01-26

## 2022-03-02 PROCEDURE — V2103 SPHEROCYLINDR 4.00D/12-2.00D: HCPCS | Performed by: OPTOMETRIST

## 2022-03-02 PROCEDURE — V2799 MISC VISION ITEM OR SERVICE: HCPCS | Performed by: OPTOMETRIST

## 2022-03-02 PROCEDURE — 92012 INTRM OPH EXAM EST PATIENT: CPT | Performed by: OPTOMETRIST

## 2022-03-02 PROCEDURE — V2020 VISION SVCS FRAMES PURCHASES: HCPCS | Performed by: OPTOMETRIST

## 2022-03-02 PROCEDURE — V2784 LENS POLYCARB OR EQUAL: HCPCS | Performed by: OPTOMETRIST

## 2022-03-02 ASSESSMENT — REFRACTION_MANIFEST
OD_VA2: 20/20-2
OD_AXIS: 155
OS_AXIS: 005
OS_CYLINDER: -1.00
OS_VA1: 20/20-2
OS_VA2: 20/20-2
OD_CYLINDER: -0.50
OD_VA1: 20/20-2
OD_SPHERE: +0.75
OS_SPHERE: +1.25

## 2022-03-02 ASSESSMENT — REFRACTION_CURRENTRX
OS_AXIS: 006
OD_AXIS: 171
OS_OVR_VA: 20/
OS_SPHERE: +0.75
OD_OVR_VA: 20/
OD_SPHERE: +0.75
OS_CYLINDER: -0.50
OD_CYLINDER: -0.75

## 2022-03-02 ASSESSMENT — SPHEQUIV_DERIVED
OD_SPHEQUIV: 0.5
OS_SPHEQUIV: 0.75
OD_SPHEQUIV: 0.25
OS_SPHEQUIV: 0.125

## 2022-03-02 ASSESSMENT — KERATOMETRY
OD_K1POWER_DIOPTERS: 42.25
OS_AXISANGLE_DEGREES: 108
OD_K2POWER_DIOPTERS: 43.50
OS_K1POWER_DIOPTERS: 42.00
OS_K2POWER_DIOPTERS: 43.25
OD_AXISANGLE_DEGREES: 074

## 2022-03-02 ASSESSMENT — REFRACTION_AUTOREFRACTION
OS_SPHERE: -0.50
OS_AXIS: 091
OS_CYLINDER: +1.25
OD_CYLINDER: +0.50
OD_SPHERE: 0.00
OD_AXIS: 055

## 2022-03-02 ASSESSMENT — CONFRONTATIONAL VISUAL FIELD TEST (CVF)
OD_FINDINGS: FULL
OS_FINDINGS: FULL

## 2022-03-02 ASSESSMENT — VISUAL ACUITY
OS_BCVA: 20/25
OD_BCVA: 20/25-2

## 2022-03-02 ASSESSMENT — AXIALLENGTH_DERIVED
OS_AL: 23.6211
OD_AL: 23.7248
OD_AL: 23.6267
OS_AL: 23.8678

## 2023-03-03 ENCOUNTER — APPOINTMENT (EMERGENCY)
Dept: CT IMAGING | Facility: HOSPITAL | Age: 43
End: 2023-03-03

## 2023-03-03 ENCOUNTER — HOSPITAL ENCOUNTER (EMERGENCY)
Facility: HOSPITAL | Age: 43
Discharge: HOME | End: 2023-03-03
Attending: EMERGENCY MEDICINE

## 2023-03-03 VITALS
DIASTOLIC BLOOD PRESSURE: 70 MMHG | WEIGHT: 140.43 LBS | RESPIRATION RATE: 16 BRPM | BODY MASS INDEX: 22.04 KG/M2 | OXYGEN SATURATION: 100 % | HEART RATE: 67 BPM | HEIGHT: 67 IN | TEMPERATURE: 97.6 F | SYSTOLIC BLOOD PRESSURE: 116 MMHG

## 2023-03-03 DIAGNOSIS — N21.0 BLADDER STONE: Primary | ICD-10-CM

## 2023-03-03 LAB
ALBUMIN SERPL BCP-MCNC: 3.8 G/DL (ref 3.5–5)
ALP SERPL-CCNC: 39 U/L (ref 34–104)
ALT SERPL W P-5'-P-CCNC: 58 U/L (ref 7–52)
ANION GAP SERPL CALCULATED.3IONS-SCNC: 3 MMOL/L (ref 4–13)
AST SERPL W P-5'-P-CCNC: 50 U/L (ref 13–39)
BACTERIA UR QL AUTO: ABNORMAL /HPF
BASOPHILS # BLD AUTO: 0.04 THOUSANDS/ÂΜL (ref 0–0.1)
BASOPHILS NFR BLD AUTO: 0 % (ref 0–1)
BILIRUB SERPL-MCNC: 0.48 MG/DL (ref 0.2–1)
BILIRUB UR QL STRIP: NEGATIVE
BUN SERPL-MCNC: 10 MG/DL (ref 5–25)
CALCIUM SERPL-MCNC: 8.9 MG/DL (ref 8.4–10.2)
CAOX CRY URNS QL MICRO: ABNORMAL /HPF
CHLORIDE SERPL-SCNC: 105 MMOL/L (ref 96–108)
CLARITY UR: ABNORMAL
CO2 SERPL-SCNC: 31 MMOL/L (ref 21–32)
COLOR UR: YELLOW
CREAT SERPL-MCNC: 0.74 MG/DL (ref 0.6–1.3)
EOSINOPHIL # BLD AUTO: 0.05 THOUSAND/ÂΜL (ref 0–0.61)
EOSINOPHIL NFR BLD AUTO: 1 % (ref 0–6)
ERYTHROCYTE [DISTWIDTH] IN BLOOD BY AUTOMATED COUNT: 13.8 % (ref 11.6–15.1)
GFR SERPL CREATININE-BSD FRML MDRD: 101 ML/MIN/1.73SQ M
GLUCOSE SERPL-MCNC: 95 MG/DL (ref 65–140)
GLUCOSE UR STRIP-MCNC: NEGATIVE MG/DL
HCT VFR BLD AUTO: 39 % (ref 34.8–46.1)
HGB BLD-MCNC: 12.8 G/DL (ref 11.5–15.4)
HGB UR QL STRIP.AUTO: ABNORMAL
IMM GRANULOCYTES # BLD AUTO: 0.05 THOUSAND/UL (ref 0–0.2)
IMM GRANULOCYTES NFR BLD AUTO: 1 % (ref 0–2)
KETONES UR STRIP-MCNC: ABNORMAL MG/DL
LEUKOCYTE ESTERASE UR QL STRIP: NEGATIVE
LYMPHOCYTES # BLD AUTO: 1.37 THOUSANDS/ÂΜL (ref 0.6–4.47)
LYMPHOCYTES NFR BLD AUTO: 15 % (ref 14–44)
MCH RBC QN AUTO: 30.3 PG (ref 26.8–34.3)
MCHC RBC AUTO-ENTMCNC: 32.8 G/DL (ref 31.4–37.4)
MCV RBC AUTO: 92 FL (ref 82–98)
MONOCYTES # BLD AUTO: 0.97 THOUSAND/ÂΜL (ref 0.17–1.22)
MONOCYTES NFR BLD AUTO: 10 % (ref 4–12)
NEUTROPHILS # BLD AUTO: 6.82 THOUSANDS/ÂΜL (ref 1.85–7.62)
NEUTS SEG NFR BLD AUTO: 73 % (ref 43–75)
NITRITE UR QL STRIP: NEGATIVE
NON-SQ EPI CELLS URNS QL MICRO: ABNORMAL /HPF
NRBC BLD AUTO-RTO: 0 /100 WBCS
PH UR STRIP.AUTO: 6 [PH]
PLATELET # BLD AUTO: 176 THOUSANDS/UL (ref 149–390)
PMV BLD AUTO: 11.2 FL (ref 8.9–12.7)
POTASSIUM SERPL-SCNC: 3.6 MMOL/L (ref 3.5–5.3)
PROT SERPL-MCNC: 5.6 G/DL (ref 6.4–8.4)
PROT UR STRIP-MCNC: NEGATIVE MG/DL
RBC # BLD AUTO: 4.23 MILLION/UL (ref 3.81–5.12)
RBC #/AREA URNS AUTO: ABNORMAL /HPF
SODIUM SERPL-SCNC: 139 MMOL/L (ref 135–147)
SP GR UR STRIP.AUTO: 1.02 (ref 1–1.03)
UROBILINOGEN UR QL STRIP.AUTO: 0.2 E.U./DL
WBC # BLD AUTO: 9.3 THOUSAND/UL (ref 4.31–10.16)
WBC #/AREA URNS AUTO: ABNORMAL /HPF

## 2023-03-03 RX ORDER — ONDANSETRON 2 MG/ML
4 INJECTION INTRAMUSCULAR; INTRAVENOUS ONCE
Status: DISCONTINUED | OUTPATIENT
Start: 2023-03-03 | End: 2023-03-03 | Stop reason: HOSPADM

## 2023-03-03 RX ADMIN — SODIUM CHLORIDE 1000 ML: 0.9 INJECTION, SOLUTION INTRAVENOUS at 15:13

## 2023-03-03 RX ADMIN — SODIUM CHLORIDE 1000 ML: 0.9 INJECTION, SOLUTION INTRAVENOUS at 15:37

## 2023-03-03 NOTE — Clinical Note
Delon Son was seen and treated in our emergency department on 3/3/2023  Diagnosis:     Edith Neither  may return to work on return date  She may return on this date: 03/05/2023         If you have any questions or concerns, please don't hesitate to call        Bernardo Merino MD    ______________________________           _______________          _______________  Hospital Representative                              Date                                Time

## 2023-03-03 NOTE — ED PROVIDER NOTES
History  Chief Complaint   Patient presents with   • Flank Pain     Left flank pain since 0900 with nausea  Patient planes of sudden onset of left flank pain that radiates to the left side of the abdomen  Started approximately at 9 AM today  Has nausea but no vomiting  Past history of kidney stones and feels just like it  Having difficulty urinating  Only going small amounts  No dysuria  Was given 15 mg of IV Toradol and droperidol 0 125 mg IV prior to arrival with some relief of her symptoms  History provided by:  Patient   used: No    Flank Pain  Pain location:  L flank  Pain quality: aching    Pain radiation: Radiates to the left lower quadrant  Pain severity:  Mild  Onset quality:  Sudden  Duration:  5 hours  Timing:  Constant  Progression:  Waxing and waning  Chronicity:  New  Context: not eating, not recent sexual activity and not sick contacts    Relieved by:  Nothing  Worsened by:  Nothing  Ineffective treatments:  None tried  Associated symptoms: anorexia, nausea and vomiting    Associated symptoms: no chest pain, no chills, no constipation, no cough, no diarrhea, no dysuria, no fever, no hematemesis, no hematuria, no shortness of breath and no sore throat        Prior to Admission Medications   Prescriptions Last Dose Informant Patient Reported? Taking? Ocrelizumab (OCREVUS IV)   Yes Yes   Sig: Infuse into a venous catheter   carBAMazepine (TEGretol XR) 100 mg 12 hr tablet   Yes Yes   Sig: TAKE 2 TABLETS IN THE MORNING, 1 TABLET MID-DAY, AND 2 TABLETS AT BEDTIME   desogestrel-ethinyl estradiol (KARIVA) 0 15-0 02/0 01 MG (21/5) per tablet Not Taking  Yes No   Sig: Take 1 tablet by mouth   Patient not taking: Reported on 3/3/2023   fexofenadine-pseudoephedrine (ALLEGRA-D)  MG per tablet   Yes Yes   Sig: Take 1 tablet by mouth   fluticasone (FLONASE) 50 mcg/act nasal spray   Yes Yes   Sig: ADMINISTER 2 SPRAYS INTO EACH NOSTRIL DAILY     gabapentin (NEURONTIN) 300 mg capsule   Yes Yes   Sig: TAKE ONE CAPSULE BY MOUTH EVERY EVENING   modafinil (PROVIGIL) 100 mg tablet   Yes Yes   Sig: Take 100 mg by mouth daily   nitrofurantoin (MACROBID) 100 mg capsule Not Taking  No No   Sig: Take 1 capsule (100 mg total) by mouth 2 (two) times a day   Patient not taking: Reported on 3/3/2023   ondansetron (ZOFRAN) 4 mg tablet Not Taking  No No   Sig: Take 1 tablet (4 mg total) by mouth every 6 (six) hours   Patient not taking: Reported on 3/3/2023   topiramate (TOPAMAX) 50 MG tablet   Yes Yes   Sig: Take 50 mg by mouth      Facility-Administered Medications: None       Past Medical History:   Diagnosis Date   [facility]    • Kidney stones    • MS (multiple sclerosis) ([facility] )    • Trigeminal neuralgia        Past Surgical History:   Procedure Laterality Date   • APPENDECTOMY     • HYSTERECTOMY     • KIDNEY STONE SURGERY      several times   • OTHER SURGICAL HISTORY      kidney stones       History reviewed  No pertinent family history  I have reviewed and agree with the history as documented  E-Cigarette/Vaping   • E-Cigarette Use Never User      E-Cigarette/Vaping Substances     Social History     Tobacco Use   • Smoking status: Every Day   • Smokeless tobacco: Never   Vaping Use   • Vaping Use: Never used   Substance Use Topics   • Alcohol use: Not Currently   • Drug use: Not Currently       Review of Systems   Constitutional: Negative for chills and fever  HENT: Negative for ear pain, hearing loss, sore throat, trouble swallowing and voice change  Eyes: Negative for pain and discharge  Respiratory: Negative for cough, shortness of breath and wheezing  Cardiovascular: Negative for chest pain and palpitations  Gastrointestinal: Positive for anorexia, nausea and vomiting  Negative for abdominal pain, blood in stool, constipation, diarrhea and hematemesis  Genitourinary: Positive for flank pain  Negative for dysuria, frequency and hematuria     Musculoskeletal: Negative for joint swelling, neck pain and neck stiffness  Skin: Negative for rash and wound  Neurological: Negative for dizziness, seizures, syncope, facial asymmetry and headaches  Psychiatric/Behavioral: Negative for hallucinations, self-injury and suicidal ideas  All other systems reviewed and are negative  Physical Exam  Physical Exam  Vitals and nursing note reviewed  Constitutional:       General: She is not in acute distress  Appearance: She is well-developed  HENT:      Head: Normocephalic and atraumatic  Right Ear: External ear normal       Left Ear: External ear normal    Eyes:      General: No scleral icterus  Right eye: No discharge  Left eye: No discharge  Extraocular Movements: Extraocular movements intact  Conjunctiva/sclera: Conjunctivae normal    Cardiovascular:      Rate and Rhythm: Normal rate and regular rhythm  Heart sounds: Normal heart sounds  No murmur heard  Pulmonary:      Effort: Pulmonary effort is normal       Breath sounds: Normal breath sounds  No wheezing or rales  Abdominal:      General: Bowel sounds are normal  There is no distension  Palpations: Abdomen is soft  Tenderness: There is no abdominal tenderness  There is no guarding or rebound  Musculoskeletal:         General: No deformity  Normal range of motion  Cervical back: Normal range of motion and neck supple  Skin:     General: Skin is warm and dry  Findings: No rash  Neurological:      General: No focal deficit present  Mental Status: She is alert and oriented to person, place, and time  Cranial Nerves: No cranial nerve deficit  Psychiatric:         Mood and Affect: Mood normal          Behavior: Behavior normal          Thought Content:  Thought content normal          Judgment: Judgment normal          Vital Signs  ED Triage Vitals [03/03/23 1350]   Temperature Pulse Respirations Blood Pressure SpO2   97 6 °F (36 4 °C) 71 16 126/76 100 %      Temp Source Heart Rate Source Patient Position - Orthostatic VS BP Location FiO2 (%)   Temporal Monitor Lying Right arm --      Pain Score       No Pain           Vitals:    03/03/23 1350 03/03/23 1400 03/03/23 1430   BP: 126/76 108/68 116/70   Pulse: 71 67 67   Patient Position - Orthostatic VS: Lying           Visual Acuity      ED Medications  Medications   morphine injection 2 mg (0 mg Intravenous Hold 3/3/23 1513)   ondansetron (ZOFRAN) injection 4 mg (0 mg Intravenous Hold 3/3/23 1514)   sodium chloride 0 9 % bolus 1,000 mL (1,000 mL Intravenous New Bag 3/3/23 1513)   sodium chloride 0 9 % bolus 1,000 mL (1,000 mL Intravenous New Bag 3/3/23 1537)       Diagnostic Studies  Results Reviewed     Procedure Component Value Units Date/Time    UA w Reflex to Microscopic w Reflex to Culture [[id number]]  (Abnormal) Collected: 03/03/23 1634    Lab Status: Final result Specimen: Urine, Clean Catch Updated: 03/03/23 1642     Color, UA Yellow     Clarity, UA Slightly Cloudy     Specific Orient, UA 1 020     pH, UA 6 0     Leukocytes, UA Negative     Nitrite, UA Negative     Protein, UA Negative mg/dl      Glucose, UA Negative mg/dl      Ketones, UA 15 (1+) mg/dl      Urobilinogen, UA 0 2 E U /dl      Bilirubin, UA Negative     Occult Blood, UA Large    Urine Microscopic [[id number]] Collected: 03/03/23 1634    Lab Status:  In process Specimen: Urine, Clean Catch Updated: 03/03/23 1642    Comprehensive metabolic panel [[id number]]  (Abnormal) Collected: 03/03/23 1403    Lab Status: Final result Specimen: Blood from Arm, Left Updated: 03/03/23 1426     Sodium 139 mmol/L      Potassium 3 6 mmol/L      Chloride 105 mmol/L      CO2 31 mmol/L      ANION GAP 3 mmol/L      BUN 10 mg/dL      Creatinine 0 74 mg/dL      Glucose 95 mg/dL      Calcium 8 9 mg/dL      AST 50 U/L      ALT 58 U/L      Alkaline Phosphatase 39 U/L      Total Protein 5 6 g/dL      Albumin 3 8 g/dL      Total Bilirubin 0 48 mg/dL      eGFR 101 ml/min/1 73sq m     Narrative:      National Kidney Disease Foundation guidelines for Chronic Kidney Disease (CKD):   •  Stage 1 with normal or high GFR (GFR > 90 mL/min/1 73 square meters)  •  Stage 2 Mild CKD (GFR = 60-89 mL/min/1 73 square meters)  •  Stage 3A Moderate CKD (GFR = 45-59 mL/min/1 73 square meters)  •  Stage 3B Moderate CKD (GFR = 30-44 mL/min/1 73 square meters)  •  Stage 4 Severe CKD (GFR = 15-29 mL/min/1 73 square meters)  •  Stage 5 End Stage CKD (GFR <15 mL/min/1 73 square meters)  Note: GFR calculation is accurate only with a steady state creatinine    CBC and differential [[id number]] Collected: 03/03/23 1403    Lab Status: Final result Specimen: Blood from Arm, Left Updated: 03/03/23 1407     WBC 9 30 Thousand/uL      RBC 4 23 Million/uL      Hemoglobin 12 8 g/dL      Hematocrit 39 0 %      MCV 92 fL      MCH 30 3 pg      MCHC 32 8 g/dL      RDW 13 8 %      MPV 11 2 fL      Platelets 270 Thousands/uL      nRBC 0 /100 WBCs      Neutrophils Relative 73 %      Immat GRANS % 1 %      Lymphocytes Relative 15 %      Monocytes Relative 10 %      Eosinophils Relative 1 %      Basophils Relative 0 %      Neutrophils Absolute 6 82 Thousands/µL      Immature Grans Absolute 0 05 Thousand/uL      Lymphocytes Absolute 1 37 Thousands/µL      Monocytes Absolute 0 97 Thousand/µL      Eosinophils Absolute 0 05 Thousand/µL      Basophils Absolute 0 04 Thousands/µL                  CT renal stone study abdomen pelvis wo contrast   Final Result by Leigh Ford MD (03/03 0051)         1  Mild left hydronephrosis and hydroureter with a 2 mm calculus in the urinary bladder, likely a recently passed calculus  2   Bilateral nonobstructing renal calculi  The study was marked in [facility] for immediate notification              Workstation performed: [id number]                    Procedures  Procedures         ED Course                                             Medical Decision Making  Amount and/or Complexity of Data Reviewed  Independent Historian: EMS  Labs: ordered  Decision-making details documented in ED Course  Radiology: ordered  Decision-making details documented in ED Course  Risk  OTC drugs  Prescription drug management  Decision regarding hospitalization  Disposition  Final diagnoses:   Bladder stone     Time reflects when diagnosis was documented in both MDM as applicable and the Disposition within this note     Time User Action Codes Description Comment    3/3/2023  3:04 PM Renee Dinh Add [N21 0] Bladder stone       ED Disposition     ED Disposition   Discharge    Condition   Stable    Date/Time   Fri Mar 3, 2023  4:43 PM    Comment   Nelida Mood discharge to home/self care  Follow-up Information     Follow up With Specialties Details Why Contact Info    Buck Swanson MD Family Medicine Call in 3 days  [facility] [address] [facility]  [phone]      Buck Swanson MD Family Medicine Call in 3 days  [facility] [address] [address] [phone]            Patient's Medications   Discharge Prescriptions    No medications on file       No discharge procedures on file      PDMP Review     None          ED Provider  Electronically Signed by           Reid Arevalo MD  03/03/23 9567

## 2025-01-09 ENCOUNTER — DOCTOR'S OFFICE (OUTPATIENT)
Dept: URBAN - NONMETROPOLITAN AREA CLINIC 1 | Facility: CLINIC | Age: 45
Setting detail: OPHTHALMOLOGY
End: 2025-01-09
Payer: COMMERCIAL

## 2025-01-09 ENCOUNTER — RX ONLY (RX ONLY)
Age: 45
End: 2025-01-09

## 2025-01-09 DIAGNOSIS — H52.223: ICD-10-CM

## 2025-01-09 DIAGNOSIS — H52.03: ICD-10-CM

## 2025-01-09 PROBLEM — H16.211: Status: ACTIVE | Noted: 2025-01-09

## 2025-01-09 PROBLEM — H52.4 PRESBYOPIA: Status: ACTIVE | Noted: 2025-01-09

## 2025-01-09 PROCEDURE — 92015 DETERMINE REFRACTIVE STATE: CPT

## 2025-01-09 PROCEDURE — 92014 COMPRE OPH EXAM EST PT 1/>: CPT

## 2025-01-09 ASSESSMENT — REFRACTION_AUTOREFRACTION
OS_SPHERE: +1.75
OD_CYLINDER: 0.00
OS_AXIS: 018
OS_CYLINDER: -0.75
OD_SPHERE: +2.00
OD_AXIS: 055

## 2025-01-09 ASSESSMENT — REFRACTION_MANIFEST
OS_CYLINDER: -1.00
OS_VA2: 20/20
OS_VA1: 20/20
OS_SPHERE: +1.50
OD_ADD: +1.25
OD_AXIS: 115
OU_VA: 20/20
OD_CYLINDER: -0.25
OS_AXIS: 005
OD_SPHERE: +1.50
OD_VA2: 20/50
OS_ADD: +1.25
OD_VA1: 20/50

## 2025-01-09 ASSESSMENT — REFRACTION_CURRENTRX
OS_CYLINDER: -1.00
OD_SPHERE: +0.75
OS_OVR_VA: 20/
OD_AXIS: 153
OD_VPRISM_DIRECTION: SV
OS_VPRISM_DIRECTION: SV
OS_SPHERE: +1.25
OD_CYLINDER: -0.50
OS_AXIS: 002
OD_OVR_VA: 20/

## 2025-01-09 ASSESSMENT — KERATOMETRY
OD_AXISANGLE_DEGREES: 074
OS_K2POWER_DIOPTERS: 43.25
OD_K2POWER_DIOPTERS: 43.50
OD_K1POWER_DIOPTERS: 42.25
OS_AXISANGLE_DEGREES: 108
OS_K1POWER_DIOPTERS: 42.00

## 2025-01-09 ASSESSMENT — CONFRONTATIONAL VISUAL FIELD TEST (CVF)
OD_FINDINGS: FULL
OS_FINDINGS: FULL

## 2025-01-09 ASSESSMENT — TONOMETRY
OD_IOP_MMHG: 16
OS_IOP_MMHG: 16

## 2025-01-09 ASSESSMENT — VISUAL ACUITY
OD_BCVA: 20/25-2
OS_BCVA: 20/70-2

## 2025-02-11 ENCOUNTER — DOCTOR'S OFFICE (OUTPATIENT)
Dept: URBAN - NONMETROPOLITAN AREA CLINIC 1 | Facility: CLINIC | Age: 45
Setting detail: OPHTHALMOLOGY
End: 2025-02-11
Payer: COMMERCIAL

## 2025-02-11 DIAGNOSIS — H16.211: ICD-10-CM

## 2025-02-11 DIAGNOSIS — H52.4: ICD-10-CM

## 2025-02-11 DIAGNOSIS — G37.9: ICD-10-CM

## 2025-02-11 PROCEDURE — 92083 EXTENDED VISUAL FIELD XM: CPT

## 2025-02-11 PROCEDURE — 92012 INTRM OPH EXAM EST PATIENT: CPT

## 2025-02-11 PROCEDURE — 92310 CONTACT LENS FITTING OU: CPT

## 2025-02-11 ASSESSMENT — REFRACTION_AUTOREFRACTION
OD_SPHERE: +0.50
OS_AXIS: 016
OD_AXIS: 142
OD_CYLINDER: -0.50
OS_SPHERE: +1.25
OS_CYLINDER: -0.50

## 2025-02-11 ASSESSMENT — REFRACTION_MANIFEST
OD_VA2: 20/25
OS_SPHERE: +1.50
OS_AXIS: 010
OD_ADD: +1.25
OS_CYLINDER: -1.00
OS_VA2: 20/20
OS_ADD: +1.25
OS_VA1: 20/20
OD_AXIS: 075
OD_CYLINDER: -0.50
OD_SPHERE: +1.00
OD_VA1: 20/25
OU_VA: 20/20

## 2025-02-11 ASSESSMENT — REFRACTION_CURRENTRX
OD_SPHERE: +2.25
OD_AXIS: 072
OD_CYLINDER: -0.25
OS_OVR_VA: 20/
OS_SPHERE: +2.25
OD_OVR_VA: 20/
OS_AXIS: 172
OD_VPRISM_DIRECTION: SV
OS_VPRISM_DIRECTION: SV
OS_CYLINDER: -1.00

## 2025-02-11 ASSESSMENT — KERATOMETRY
OS_K2POWER_DIOPTERS: 43.25
OD_AXISANGLE_DEGREES: 074
OS_K1POWER_DIOPTERS: 42.00
OD_K2POWER_DIOPTERS: 43.50
OS_AXISANGLE_DEGREES: 108
OD_K1POWER_DIOPTERS: 42.25

## 2025-02-11 ASSESSMENT — VISUAL ACUITY
OS_BCVA: 20/60-2
OD_BCVA: 20/30-1

## 2025-02-11 ASSESSMENT — CONFRONTATIONAL VISUAL FIELD TEST (CVF)
OS_FINDINGS: FULL
OD_FINDINGS: FULL

## 2025-08-21 ENCOUNTER — DOCTOR'S OFFICE (OUTPATIENT)
Dept: URBAN - NONMETROPOLITAN AREA CLINIC 1 | Facility: CLINIC | Age: 45
Setting detail: OPHTHALMOLOGY
End: 2025-08-21
Payer: COMMERCIAL

## 2025-08-21 DIAGNOSIS — H16.211: ICD-10-CM

## 2025-08-21 DIAGNOSIS — G37.9: ICD-10-CM

## 2025-08-21 PROCEDURE — 92012 INTRM OPH EXAM EST PATIENT: CPT

## 2025-08-21 ASSESSMENT — KERATOMETRY
OD_K2POWER_DIOPTERS: 43.50
OD_AXISANGLE_DEGREES: 074
OS_K1POWER_DIOPTERS: 42.00
OS_AXISANGLE_DEGREES: 108
OD_K1POWER_DIOPTERS: 42.25
OS_K2POWER_DIOPTERS: 43.25

## 2025-08-21 ASSESSMENT — REFRACTION_CURRENTRX
OD_VPRISM_DIRECTION: SV
OS_SPHERE: +2.25
OS_VPRISM_DIRECTION: SV
OD_CYLINDER: -0.25
OS_OVR_VA: 20/
OS_CYLINDER: -1.00
OD_AXIS: 072
OD_SPHERE: +2.25
OD_OVR_VA: 20/
OS_AXIS: 172

## 2025-08-21 ASSESSMENT — REFRACTION_AUTOREFRACTION
OS_CYLINDER: -0.50
OD_AXIS: 142
OS_AXIS: 016
OS_SPHERE: +1.25
OD_CYLINDER: -0.50
OD_SPHERE: +0.50

## 2025-08-21 ASSESSMENT — REFRACTION_MANIFEST
OS_ADD: +1.25
OS_SPHERE: +1.50
OS_CYLINDER: -1.00
OD_VA1: 20/25
OD_VA2: 20/25
OS_VA2: 20/20
OU_VA: 20/20
OD_CYLINDER: -0.50
OS_VA1: 20/20
OD_SPHERE: +1.00
OD_AXIS: 075
OD_ADD: +1.25
OS_AXIS: 010

## 2025-08-21 ASSESSMENT — CONFRONTATIONAL VISUAL FIELD TEST (CVF)
OD_FINDINGS: FULL
OS_FINDINGS: FULL

## 2025-08-21 ASSESSMENT — VISUAL ACUITY
OS_BCVA: 20/30-2
OD_BCVA: 20/30